# Patient Record
Sex: FEMALE | Race: WHITE | NOT HISPANIC OR LATINO | Employment: UNEMPLOYED
[De-identification: names, ages, dates, MRNs, and addresses within clinical notes are randomized per-mention and may not be internally consistent; named-entity substitution may affect disease eponyms.]

---

## 2017-05-27 ENCOUNTER — HEALTH MAINTENANCE LETTER (OUTPATIENT)
Age: 42
End: 2017-05-27

## 2017-09-02 ENCOUNTER — HEALTH MAINTENANCE LETTER (OUTPATIENT)
Age: 42
End: 2017-09-02

## 2017-09-29 ENCOUNTER — HOSPITAL ENCOUNTER (OUTPATIENT)
Dept: BEHAVIORAL HEALTH | Facility: CLINIC | Age: 42
End: 2017-09-29
Attending: PSYCHIATRY & NEUROLOGY
Payer: COMMERCIAL

## 2017-09-29 VITALS
DIASTOLIC BLOOD PRESSURE: 63 MMHG | SYSTOLIC BLOOD PRESSURE: 78 MMHG | TEMPERATURE: 97.6 F | HEIGHT: 64 IN | WEIGHT: 251.8 LBS | BODY MASS INDEX: 42.99 KG/M2 | HEART RATE: 81 BPM

## 2017-09-29 PROCEDURE — 10020000 ZZH LODGING PLUS FACILITY CHARGE ADULT

## 2017-09-29 PROCEDURE — 40000007 ZZH STATISTIC ADULT CD FACE TO FACE-NO CHRG

## 2017-09-29 RX ORDER — AMOXICILLIN 250 MG
2 CAPSULE ORAL DAILY PRN
COMMUNITY
End: 2017-10-21

## 2017-09-29 ASSESSMENT — PATIENT HEALTH QUESTIONNAIRE - PHQ9: SUM OF ALL RESPONSES TO PHQ QUESTIONS 1-9: 17

## 2017-09-29 ASSESSMENT — ANXIETY QUESTIONNAIRES
5. BEING SO RESTLESS THAT IT IS HARD TO SIT STILL: SEVERAL DAYS
7. FEELING AFRAID AS IF SOMETHING AWFUL MIGHT HAPPEN: MORE THAN HALF THE DAYS
1. FEELING NERVOUS, ANXIOUS, OR ON EDGE: NEARLY EVERY DAY
2. NOT BEING ABLE TO STOP OR CONTROL WORRYING: NEARLY EVERY DAY
3. WORRYING TOO MUCH ABOUT DIFFERENT THINGS: NEARLY EVERY DAY
GAD7 TOTAL SCORE: 17
4. TROUBLE RELAXING: NEARLY EVERY DAY
6. BECOMING EASILY ANNOYED OR IRRITABLE: MORE THAN HALF THE DAYS
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
## 2017-09-29 NOTE — PROGRESS NOTES
9/29/17  Pt entered the [facility] unit, this date.  Counselor met with Pt and provided basic materials to include a treatment goal sheet and a patient safety plan worksheet.  Pt appears open and willing for treatment at this time.  Pt to attend the [facility] orientation and continue program.

## 2017-09-29 NOTE — PROGRESS NOTES
Name: Jacey Sandoval  Date: 9/29/2017  Medical Record: [id number]    Envelope Number: [id number]    List of Contents (List each item separately in new row):   1 black (Tracie brand) mixing dye container  1 maroon liquid measurement container  2 sachets/packets of [employer] Color[employer]ase  Admission:  I am responsible for any personal items that are not sent to the safe or pharmacy.  [facility] is not responsible for loss, theft or damage of any property in my possession.      Patient Signature:  ___________________________________________       Date/Time:__________________________    Staff Signature: __________________________________       Date/Time:__________________________    2nd Staff person, if patient is unable/unwilling to sign:      __________________________________________________________       Date/Time: __________________________      Discharge:  [facility] has returned all of my personal belongings:    Patient Signature: ________________________________________     Date/Time: ____________________________________    Staff Signature: ______________________________________     Date/Time:_____________________________________

## 2017-09-29 NOTE — PROGRESS NOTES
Initial Services Plan        Before your first treatment group, please do the following    Immediate health & safety concerns: Look for sober housing and a supportive social network.  Obtain personal items (glasses, hearing aides, medicines, diabetes supplies, clothing, etc.).  Look for a support network (such as AA, NA, DBT group, a Catholic group, etc.)    Suggestions for client during the time between intake & completion of treatment plan:  Tour your treatment center (unit or outpatient clinic).  Introduce yourself to the treatment group.  Spend time getting to know your peers.  Complete your psycho-social paperwork.  Complete the problem list for your treatment plan.  Start drug and alcohol use history.  Review your patient or client handbook.  Identify concerns about whom to ask for family week    Client issues to be addressed in the first treatment sessions:  Identify outside concerns that may interfere with treatment, i.e. bills not getting paid, homesick for children      NABIL Preston  9/29/2017  11:11 AM
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
## 2017-09-29 NOTE — PROGRESS NOTES
[place] [address]            Dear Amalia Ortega,    This is to verify that Jacey Sandoval (: 1975) was admitted for treatment of chemical dependency at [place], [place] on 2017.    This individual is currently participating in:   ______ Inpatient   ___x__ [facility] (Residential Non-Medical)   ______ Day Outpatient   ______ Evening Outpatient       The client will participate in the program 28 days.  Treatment is A.A. based and helps clients to achieve a chemically free lifestyle through lectures, individual, family and group therapy.  She has been assigned to Raghav Harley and Katya Heck  /  Telephone: [phone] as her primary counselors.  If you have further questions, please contact us.    Respectfully,      Jacey Ram MA [facility]    (faxed 10/2/2017)

## 2017-09-29 NOTE — PROGRESS NOTES
Name: Jacey Sandoval  Date: 9/29/2017  Medical Record: [id number]    Envelope Number: [id number]    List of Contents (List each item separately in new row):   1 60ml (tube) I.color (permanent conditioning creme color)  1pc. black hairdye brush  2 tubes (LOGICS) color DNA system (small boxes 2pcs)  Admission:  I am responsible for any personal items that are not sent to the safe or pharmacy.  [facility] is not responsible for loss, theft or damage of any property in my possession.      Patient Signature:  ___________________________________________       Date/Time:__________________________    Staff Signature: __________________________________       Date/Time:__________________________    2nd Staff person, if patient is unable/unwilling to sign:      __________________________________________________________       Date/Time: __________________________      Discharge:  [facility] has returned all of my personal belongings:    Patient Signature: ________________________________________     Date/Time: ____________________________________    Staff Signature: ______________________________________     Date/Time:_____________________________________

## 2017-09-30 ENCOUNTER — HOSPITAL ENCOUNTER (OUTPATIENT)
Dept: BEHAVIORAL HEALTH | Facility: CLINIC | Age: 42
End: 2017-09-30
Attending: FAMILY MEDICINE
Payer: COMMERCIAL

## 2017-09-30 PROCEDURE — H2035 A/D TX PROGRAM, PER HOUR: HCPCS | Mod: HQ

## 2017-09-30 PROCEDURE — 10020000 ZZH LODGING PLUS FACILITY CHARGE ADULT

## 2017-09-30 ASSESSMENT — ANXIETY QUESTIONNAIRES: GAD7 TOTAL SCORE: 17

## 2017-10-01 ENCOUNTER — HOSPITAL ENCOUNTER (OUTPATIENT)
Dept: BEHAVIORAL HEALTH | Facility: CLINIC | Age: 42
End: 2017-10-01
Attending: FAMILY MEDICINE
Payer: COMMERCIAL

## 2017-10-01 PROCEDURE — H2035 A/D TX PROGRAM, PER HOUR: HCPCS | Mod: HQ

## 2017-10-01 PROCEDURE — 10020000 ZZH LODGING PLUS FACILITY CHARGE ADULT

## 2017-10-02 ENCOUNTER — HOSPITAL ENCOUNTER (OUTPATIENT)
Dept: BEHAVIORAL HEALTH | Facility: CLINIC | Age: 42
End: 2017-10-02
Attending: FAMILY MEDICINE
Payer: COMMERCIAL

## 2017-10-02 PROCEDURE — H2035 A/D TX PROGRAM, PER HOUR: HCPCS | Mod: HQ

## 2017-10-02 PROCEDURE — 10020000 ZZH LODGING PLUS FACILITY CHARGE ADULT
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
## 2017-10-02 NOTE — PROGRESS NOTES
"Patient Safety Plan Template    Name:   Jacey Sandoval YOB: 1975 Age:  42 year old MR Number:  [id number]   Step 1: Warning signs (Thoughts, images, mood, situation, behavior) that a crisis may be developin.  Something happens with one of my girls     2. I stop giving a damn     3.  I want \"it\" to be over     Step 2: Internal coping strategies - Things I can do to take my mind off of my problems without contacting another person (relaxation technique, physical activity):     1. Work with my critters     2.  Tanya     3. Watch a funny movie     Step 3: People and social settings that provide distraction:     1. Name: Jamaal   Phone: [phone]   2. Name: Sarah    Phone:  Non comp   3. Place: upstairs    4. Place: outside     The one thing that is most important to me and worth living for is: my girls     Step 4: People whom I can ask for help:     1. Name: Disha    Phone: non comp     2. Name: Mel   Phone: [phone]     3. Name: Jamaal Cagle Phone: [phone]     Step 5: Professionals or agencies I can contact during a crisis:     1. Clinician Name:Amalia Ortega                                  [facility] Phone:  [phone]   Clinician Pager or Emergency Contact #: non comp     2. Clinician Name: Dionicio Swift.    Phone: [phone]     Clinician Pager or Emergency Contact #: non comp     3. Local Urgent Care Services: non comp    Urgent Care Services Address: non comp    Urgent Care Services Phone: non comp     4. Suicide Prevention [facility] Phone: [phone] ([phone]     Step 6: Making the environment safe:     1. Don't buy alcohol/call for a ride     2.  Don't have any money     Safety Plan Template 2008 Jacque Cheng and Dimitri Bae is reprinted with the express permission of the authors.  No portion of the Safety Plan Template may be reproduced without the express, written permission.  You can contact the authors at [email or web address] or " [email or web address].

## 2017-10-03 ENCOUNTER — HOSPITAL ENCOUNTER (OUTPATIENT)
Dept: BEHAVIORAL HEALTH | Facility: CLINIC | Age: 42
End: 2017-10-03
Attending: FAMILY MEDICINE
Payer: COMMERCIAL

## 2017-10-03 PROCEDURE — 10020000 ZZH LODGING PLUS FACILITY CHARGE ADULT

## 2017-10-03 PROCEDURE — H2035 A/D TX PROGRAM, PER HOUR: HCPCS | Mod: HQ

## 2017-10-03 NOTE — PROGRESS NOTES
10/3/17  Counselor left a message with [facility] , Amalia Ortega, regarding requested information about Pt's admit and discharge date.

## 2017-10-04 ENCOUNTER — HOSPITAL ENCOUNTER (OUTPATIENT)
Dept: BEHAVIORAL HEALTH | Facility: CLINIC | Age: 42
End: 2017-10-04
Attending: FAMILY MEDICINE
Payer: COMMERCIAL

## 2017-10-04 PROCEDURE — 10020000 ZZH LODGING PLUS FACILITY CHARGE ADULT

## 2017-10-04 PROCEDURE — H2035 A/D TX PROGRAM, PER HOUR: HCPCS | Mod: HQ

## 2017-10-04 NOTE — PROGRESS NOTES
Acknowledgement of Current Treatment Plan     1. I have reviewed my treatment plan with my therapist / counselor on 10/4/17. I agree with the plan as it is written in the electronic health record.    Name Signature    Jacey Sandoval    Name of Therapist / Counselor     Raghav FERRER      2. I have completed and reviewed my Safety Plan with my counselor and signed this on 10/2/17. I have been given the hard copy of this plan.    Patient signature:  ________________________________________________________________________    Signatures required for any additional Problems, Goals, and/or Interventions added to treatment plan:    I have been given a copy of the addition to my treatment plan in Dimension _____ on [date           ] and I agree with this as it is written in the electronic record.     Patient signature:   ________________________________________________________________________    I have been given a copy of the addition to my treatment plan in Dimension _____ on [date           ] and I agree with this as it is written in the electronic record.      Patient signature:   ________________________________________________________________________    I have been given a copy of the addition to my treatment plan in Dimension _____ on [date          ] and I agree with this as it is written in the electronic record.

## 2017-10-04 NOTE — PROGRESS NOTES
"10/4/17  D - Patient completed \"Book of Farideh\" assignment and presented in group therapy today.  Patient processed processed her negative self-talk and identified personal strengths for positive self-talk. Patient discussed her strength of intelligence as a hinder to recovery at time, because \"you can't outsmart addiction.\"    I - Counselor facilitated group therapy and patient's peers provided supportive feedback.   A - Patient presents with flat affect and emotionally constricted. Patient is open to giving and receiving feedback during group therapy.   P - Patient to continue to work on goals in treatment plan.  NABIL Kat  "

## 2017-10-05 ENCOUNTER — HOSPITAL ENCOUNTER (OUTPATIENT)
Dept: BEHAVIORAL HEALTH | Facility: CLINIC | Age: 42
End: 2017-10-05
Attending: FAMILY MEDICINE
Payer: COMMERCIAL

## 2017-10-05 PROCEDURE — H2035 A/D TX PROGRAM, PER HOUR: HCPCS | Mod: HQ

## 2017-10-05 PROCEDURE — 10020000 ZZH LODGING PLUS FACILITY CHARGE ADULT

## 2017-10-06 ENCOUNTER — HOSPITAL ENCOUNTER (OUTPATIENT)
Dept: BEHAVIORAL HEALTH | Facility: CLINIC | Age: 42
End: 2017-10-06
Attending: FAMILY MEDICINE
Payer: COMMERCIAL

## 2017-10-06 PROCEDURE — 10020000 ZZH LODGING PLUS FACILITY CHARGE ADULT

## 2017-10-06 PROCEDURE — H2035 A/D TX PROGRAM, PER HOUR: HCPCS | Mod: HQ

## 2017-10-06 NOTE — PROGRESS NOTES
"10/06/2017    Patient shared assignment on \"Consequences\" in group this morning. Counselor facilitated group therapy with peer feedback. Patient shared that parenting, healthy relationships, enjoying life, and health as values that have been affected by her use. Patient expressed her feelings regarding her children being taken away and patient received peer feedback.   "

## 2017-10-07 ENCOUNTER — HOSPITAL ENCOUNTER (OUTPATIENT)
Dept: BEHAVIORAL HEALTH | Facility: CLINIC | Age: 42
End: 2017-10-07
Attending: FAMILY MEDICINE
Payer: COMMERCIAL

## 2017-10-07 PROCEDURE — 10020000 ZZH LODGING PLUS FACILITY CHARGE ADULT

## 2017-10-07 PROCEDURE — H2035 A/D TX PROGRAM, PER HOUR: HCPCS | Mod: HQ

## 2017-10-08 ENCOUNTER — HOSPITAL ENCOUNTER (OUTPATIENT)
Dept: BEHAVIORAL HEALTH | Facility: CLINIC | Age: 42
End: 2017-10-08
Attending: FAMILY MEDICINE
Payer: COMMERCIAL

## 2017-10-08 PROCEDURE — 10020000 ZZH LODGING PLUS FACILITY CHARGE ADULT

## 2017-10-08 PROCEDURE — H2035 A/D TX PROGRAM, PER HOUR: HCPCS | Mod: HQ

## 2017-10-09 ENCOUNTER — HOSPITAL ENCOUNTER (OUTPATIENT)
Dept: BEHAVIORAL HEALTH | Facility: CLINIC | Age: 42
End: 2017-10-09
Attending: FAMILY MEDICINE
Payer: COMMERCIAL

## 2017-10-09 PROCEDURE — H2035 A/D TX PROGRAM, PER HOUR: HCPCS

## 2017-10-09 PROCEDURE — H2035 A/D TX PROGRAM, PER HOUR: HCPCS | Mod: HQ

## 2017-10-09 PROCEDURE — 10020000 ZZH LODGING PLUS FACILITY CHARGE ADULT

## 2017-10-09 NOTE — PROGRESS NOTES
"INDIVIDUAL SESSION SUMMARY    D) Met with client on 10/9/17 from 12:30-1:25. Client reported that she has a psychiatry and therapy appointment scheduled for November at [facility] in [place]. Client reported she has been with her spouse for 4 years and that they have a 2 yr old daughter whom is living with and will soon be adopted by her spouse's cousin. Client reported she has an older daughter whom lives with her bio father. Client stated that she is unemployed and cannot drive. Client spoke of stressors including: CPS involvement, parole and lack of relationship with her children. Client spoke of having mixed feelings about the relationship with her spouse because of his own issues with alcohol and how his past relationships with women impact him today; that he has a history of reacting defensively when she speak to  about things. Client spoke of how she has hope for the relationship especially if her spouse would also commit to counseling so she is not the only one making an effort to get healthy. Client reported feeling \"resentments\" towards her spouse that she wants to work through. Client spoke about the lack of relationship with her oldest daughter and her hope that her daughter will want to have a relationship with her at some point in the future. Client spoke to how she \"relys\" on her spouse too much and her need to develop her own support system outside of her relationship. Client reported that her prior DBT therapy has helped her with emotional regulation and expressing her feelings in a healthier way and that she is committed to continue with therapy in [place].   I) Individual session with client. Provided client with verbal interventions including: validation, support.   A) Client appears emotionally constricted and to lack skills for emotional regulation and stress management. Client appears to lack a sober support network.   P) Next session is scheduled for 10/17/17.   Nhung Velez, " LMFT  10/9/2017

## 2017-10-10 ENCOUNTER — HOSPITAL ENCOUNTER (OUTPATIENT)
Dept: BEHAVIORAL HEALTH | Facility: CLINIC | Age: 42
End: 2017-10-10
Attending: FAMILY MEDICINE
Payer: COMMERCIAL

## 2017-10-10 PROCEDURE — H2035 A/D TX PROGRAM, PER HOUR: HCPCS | Mod: HQ

## 2017-10-10 PROCEDURE — 10020000 ZZH LODGING PLUS FACILITY CHARGE ADULT
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
## 2017-10-11 ENCOUNTER — HOSPITAL ENCOUNTER (OUTPATIENT)
Dept: BEHAVIORAL HEALTH | Facility: CLINIC | Age: 42
End: 2017-10-11
Attending: FAMILY MEDICINE
Payer: COMMERCIAL

## 2017-10-11 PROCEDURE — H2035 A/D TX PROGRAM, PER HOUR: HCPCS | Mod: HQ

## 2017-10-11 PROCEDURE — 10020000 ZZH LODGING PLUS FACILITY CHARGE ADULT

## 2017-10-12 ENCOUNTER — HOSPITAL ENCOUNTER (OUTPATIENT)
Dept: BEHAVIORAL HEALTH | Facility: CLINIC | Age: 42
End: 2017-10-12
Attending: FAMILY MEDICINE
Payer: COMMERCIAL

## 2017-10-12 PROCEDURE — 10020000 ZZH LODGING PLUS FACILITY CHARGE ADULT

## 2017-10-12 PROCEDURE — H2035 A/D TX PROGRAM, PER HOUR: HCPCS | Mod: HQ

## 2017-10-12 NOTE — PROGRESS NOTES
"10/12/17    Patient presented assignment on \"Self Sabotage and out Self Defeating Behaviors\" in group this morning. Counselor facilitated group therapy along with peer feedback. Patient listed 20 positive \"I Am\" affirmations and received positive peer feedback.   "

## 2017-10-13 ENCOUNTER — HOSPITAL ENCOUNTER (OUTPATIENT)
Dept: BEHAVIORAL HEALTH | Facility: CLINIC | Age: 42
End: 2017-10-13
Attending: FAMILY MEDICINE
Payer: COMMERCIAL

## 2017-10-13 PROCEDURE — H2035 A/D TX PROGRAM, PER HOUR: HCPCS | Mod: HQ

## 2017-10-13 PROCEDURE — 10020000 ZZH LODGING PLUS FACILITY CHARGE ADULT

## 2017-10-14 ENCOUNTER — HOSPITAL ENCOUNTER (OUTPATIENT)
Dept: BEHAVIORAL HEALTH | Facility: CLINIC | Age: 42
End: 2017-10-14
Attending: FAMILY MEDICINE
Payer: COMMERCIAL

## 2017-10-14 PROCEDURE — H2035 A/D TX PROGRAM, PER HOUR: HCPCS | Mod: HQ

## 2017-10-14 PROCEDURE — 10020000 ZZH LODGING PLUS FACILITY CHARGE ADULT

## 2017-10-15 ENCOUNTER — HOSPITAL ENCOUNTER (OUTPATIENT)
Dept: BEHAVIORAL HEALTH | Facility: CLINIC | Age: 42
End: 2017-10-15
Attending: FAMILY MEDICINE
Payer: COMMERCIAL

## 2017-10-15 PROCEDURE — 10020000 ZZH LODGING PLUS FACILITY CHARGE ADULT

## 2017-10-15 PROCEDURE — H2035 A/D TX PROGRAM, PER HOUR: HCPCS | Mod: HQ

## 2017-10-16 ENCOUNTER — HOSPITAL ENCOUNTER (OUTPATIENT)
Dept: BEHAVIORAL HEALTH | Facility: CLINIC | Age: 42
End: 2017-10-16
Attending: FAMILY MEDICINE
Payer: COMMERCIAL

## 2017-10-16 PROCEDURE — H2035 A/D TX PROGRAM, PER HOUR: HCPCS | Mod: HQ

## 2017-10-16 PROCEDURE — 10020000 ZZH LODGING PLUS FACILITY CHARGE ADULT

## 2017-10-16 NOTE — PROGRESS NOTES
DIM 5 RISK 4  Pt shared assignment on cross-addiction in group this am. She identified having issues with anger, drugs alcohol spending and stealing. She received supportive feedback from peers and staff. Pt appears to be distant from her emotions, there is no inflection in her tone, facial gestures or other emotions.  Pt to follow individual tx plan goals.

## 2017-10-17 ENCOUNTER — HOSPITAL ENCOUNTER (OUTPATIENT)
Dept: BEHAVIORAL HEALTH | Facility: CLINIC | Age: 42
End: 2017-10-17
Attending: FAMILY MEDICINE
Payer: COMMERCIAL

## 2017-10-17 PROCEDURE — H2035 A/D TX PROGRAM, PER HOUR: HCPCS | Mod: HQ

## 2017-10-17 PROCEDURE — 10020000 ZZH LODGING PLUS FACILITY CHARGE ADULT

## 2017-10-17 NOTE — PROGRESS NOTES
Patient:  Jacey Sandoval              Adult CD Progress Note and Treatment Plan Review     Attendance  Please refer to OP BEH CD Adult Attendance Record Documentation Flowsheet    Support group attended this week: yes    Reporting sobriety:  yes    Treatment Plan     Treatment Plan Review completed on :  10/17/17    Treatment Plan Review competed on:  Katya FERRER, Raghav ROSAS, [facility]       Client preferred learning style: Visual  Hands on  Demonstration    Staff Members contributing   Katya FERRER, Ana ROSAS, Raghav FERRER      Received Supervision: yes    Client: contributed to goals and plan.    Client received copy of plan/revised plan: Yes    Client agrees with plan/revised plan: Yes    Changes to Treatment Plan: No    New Goals added since last review none    Goals worked on since last review  Continuing stabilization,  relapse prevention,  spirituality, grief/loss, aftercare planning, self-defeating/self-sabotaging, cross-addiction    Strategies effective: yes    Strategies need these changes: NA    ASAM Risk Rating:    Dimension 1 0  Pt denies any symptoms of withdrawal at this time.  She appears full functioning.    Dimension 2 0  Pt reports she has been experiencing some headaches.  She is able to attend all programming and access medical care if needed.      Dimension 3  2  Pt reports feeling more anxious and irritable.  She reports feeling some stress in relation to being in [facility] without her doctors and medications.  Pt presents a flat affect and lacks emotional connection when sharing.  It appears Pt gains positive feelings  from peers recognizing her intellect.  Pt is continuing to meet with therapist, Nhung Velez.  Pt attended the grief/loss focus group facilitated by NILESH García MAW.  Pt denies any thoughts of self harm or suicide at this time.   Pt has a therapy appointment set up for ongoing care in her home community    Dimension  4 2  Pt is attending programming and participating in exercises.  Pt appears to be interacting with her peers.   She shared an assignment on cross-addiction sharing how compulsive patterns have included spending, eating, sex and stealing. Pt recognizes her need for balance.  Pt reports she is motivated to resolve her legal issues.     Dimension 5   4 Pt attended relapse prevention workshops and began to identify triggers/warning signs and to develop coping skills.  Pt reports she is not experiencing cravings at this time..   Pt reports feeling there is nothing hindering her from being successful in [facility] this week. Pt shared an assignment on self-defeating/self-sabotaging patterns.   Pt attended the spiritual care focus group facilitated by Tracie Cervantes and supervised by Raghav FERRER.   She verbalizes her willingness to enter an outpatient program following [facility] completion.    Dimension 6  3  Pt is attending 12 step meetings on the unit and spends time with female peers.  Pt plans to return to her home following [facility].      Any changes in Vulnerable Adult Status?  No  If yes, add to treatment plan and individual abuse prevention plan.    Family Involvement:   none schedule this week    Data:   Pt is attending daily lectures and reports the lectures on anger and emotional management were very helpful to her.  Pt reports she is working toward developing skills to manage her emotions effectively.    Intervention:   Aftercare planning  Counselor feedback  Education  Emotional management  Group feedback  Relapse prevention    Assessment:   Stages of Change Model  Contemplation  Preparation/Determination    Appears/Sounds:  Cooperative  Motivated  Engaged    Plan:  Focus on recovery environment  Monitor emotional/physical health    NABIL Hull

## 2017-10-18 ENCOUNTER — HOSPITAL ENCOUNTER (OUTPATIENT)
Dept: BEHAVIORAL HEALTH | Facility: CLINIC | Age: 42
End: 2017-10-18
Attending: FAMILY MEDICINE
Payer: COMMERCIAL

## 2017-10-18 PROCEDURE — H2035 A/D TX PROGRAM, PER HOUR: HCPCS

## 2017-10-18 PROCEDURE — H2035 A/D TX PROGRAM, PER HOUR: HCPCS | Mod: HQ

## 2017-10-18 PROCEDURE — 10020000 ZZH LODGING PLUS FACILITY CHARGE ADULT

## 2017-10-18 NOTE — PROGRESS NOTES
"INDIVIDUAL SESSION SUMMARY    D) Met with client on 10/18/17 from 1:30-2:30. Client reported feeling \"bad\" about an interaction with spouse the other day on the phone where she was \"short with him\". Client stated \"I don't like behaving in a way that's unkind but unfortunately it works\" with her spouse. Therapist and client spoke of the parent-child dynamic that has evolved in their relationship and how the client doesn't want to continue this pattern. Client reported with excitement that her spouse had lined up therapy services for himself. Client spoke about her struggle with this weeks homework exercise which was to write a letter to her mother. Client spoke to how she has accepted that she won't have the kind of emotional support she would like from her mother and that she felt writing the letter may be unhelpful for her recovery. Therapist encouraged client to speak with her counselors. Client and therapist spoke about accepting her spouse's limitations and other options she has to get her needs met other than playing the mother role in their relationship.  Client spoke of some frustrations she is having with peers in treatment and how she is coping with some peers by avoiding them. Client spoke of feeling like her boundaries were crossed in a group last week and therapist encouraged client to share the details with her primary counselors.   I) Individual session with client. Provided client with verbal interventions including: validation, support, redirection to what she can control and accepting spouse's limitations.   A) Client appears emotionally constricted and to lack skills for emotional regulation and stress management. Client appears to lack a sober support network.   P) Next session is scheduled for 10/25/17.  GABRIELA Orr  10/18/2017    "

## 2017-10-19 ENCOUNTER — HOSPITAL ENCOUNTER (OUTPATIENT)
Dept: BEHAVIORAL HEALTH | Facility: CLINIC | Age: 42
End: 2017-10-19
Attending: FAMILY MEDICINE
Payer: COMMERCIAL

## 2017-10-19 PROCEDURE — 10020000 ZZH LODGING PLUS FACILITY CHARGE ADULT

## 2017-10-19 PROCEDURE — H2035 A/D TX PROGRAM, PER HOUR: HCPCS | Mod: HQ
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
## 2017-10-20 ENCOUNTER — HOSPITAL ENCOUNTER (OUTPATIENT)
Dept: BEHAVIORAL HEALTH | Facility: CLINIC | Age: 42
End: 2017-10-20
Attending: FAMILY MEDICINE
Payer: COMMERCIAL

## 2017-10-20 PROCEDURE — H2035 A/D TX PROGRAM, PER HOUR: HCPCS | Mod: HQ

## 2017-10-20 PROCEDURE — 10020000 ZZH LODGING PLUS FACILITY CHARGE ADULT

## 2017-10-21 ENCOUNTER — HOSPITAL ENCOUNTER (OUTPATIENT)
Dept: BEHAVIORAL HEALTH | Facility: CLINIC | Age: 42
End: 2017-10-21
Attending: FAMILY MEDICINE
Payer: COMMERCIAL

## 2017-10-21 PROCEDURE — H2035 A/D TX PROGRAM, PER HOUR: HCPCS | Mod: HQ

## 2017-10-21 PROCEDURE — 10020000 ZZH LODGING PLUS FACILITY CHARGE ADULT

## 2017-10-22 ENCOUNTER — HOSPITAL ENCOUNTER (OUTPATIENT)
Dept: BEHAVIORAL HEALTH | Facility: CLINIC | Age: 42
End: 2017-10-22
Attending: FAMILY MEDICINE
Payer: COMMERCIAL

## 2017-10-22 PROCEDURE — 10020000 ZZH LODGING PLUS FACILITY CHARGE ADULT

## 2017-10-22 PROCEDURE — H2035 A/D TX PROGRAM, PER HOUR: HCPCS | Mod: HQ

## 2017-10-22 NOTE — PROGRESS NOTES
Nursing Discharge Planning Meeting    Writer completed discharge planning meeting with patient. Discharge is planned for Thursday or Friday (10/26 or 10/27) pending counselor approval as patient ride is available on Thursday. Patient will be discharging to home.     Discussed appropriate follow up care to manage migraines, blood pressure, sleep and to obtain medication refills. Patient given a copy of her current medications for reference. Questions answered and patient verbalized understanding of post-discharge follow up plan. Patient mentioned she is NOT allergic to Percocet so this was removed from her allergy list at this time. [facility] clinic appointment request made at this time for patient to obtain medication refills prior to discharge as her next appointment is not until November 7th and the patient does not have enough medication to last until then.    Patient has an appointment on 11/7/2017 at [facility] with a new provider that will take over prescribing patient's trazodone and Quetiapine.     Continue to support patient in discharge planning as needed to assure appropriate continuity of care.     Essential Oil Therapy  Patient used essential oil therapy during treatment program: No

## 2017-10-23 ENCOUNTER — HOSPITAL ENCOUNTER (OUTPATIENT)
Dept: BEHAVIORAL HEALTH | Facility: CLINIC | Age: 42
End: 2017-10-23
Attending: FAMILY MEDICINE
Payer: COMMERCIAL

## 2017-10-23 ENCOUNTER — OFFICE VISIT (OUTPATIENT)
Dept: BEHAVIORAL HEALTH | Facility: CLINIC | Age: 42
End: 2017-10-23
Payer: COMMERCIAL

## 2017-10-23 VITALS
HEART RATE: 63 BPM | TEMPERATURE: 98.2 F | OXYGEN SATURATION: 96 % | BODY MASS INDEX: 46.35 KG/M2 | DIASTOLIC BLOOD PRESSURE: 68 MMHG | SYSTOLIC BLOOD PRESSURE: 98 MMHG | RESPIRATION RATE: 16 BRPM | WEIGHT: 270 LBS

## 2017-10-23 DIAGNOSIS — F10.10 ALCOHOL ABUSE: ICD-10-CM

## 2017-10-23 DIAGNOSIS — F19.20 CHEMICAL DEPENDENCY (H): ICD-10-CM

## 2017-10-23 DIAGNOSIS — G47.00 PERSISTENT INSOMNIA: Primary | ICD-10-CM

## 2017-10-23 PROCEDURE — 10020000 ZZH LODGING PLUS FACILITY CHARGE ADULT

## 2017-10-23 PROCEDURE — H2035 A/D TX PROGRAM, PER HOUR: HCPCS | Mod: HQ

## 2017-10-23 PROCEDURE — 99214 OFFICE O/P EST MOD 30 MIN: CPT | Performed by: NURSE PRACTITIONER

## 2017-10-23 RX ORDER — QUETIAPINE FUMARATE 100 MG/1
100 TABLET, FILM COATED ORAL AT BEDTIME
Qty: 30 TABLET | Refills: 0 | Status: SHIPPED | OUTPATIENT
Start: 2017-10-23 | End: 2023-11-02

## 2017-10-23 RX ORDER — TRAZODONE HYDROCHLORIDE 100 MG/1
100 TABLET ORAL AT BEDTIME
Qty: 30 TABLET | Refills: 0 | Status: SHIPPED | OUTPATIENT
Start: 2017-10-23 | End: 2023-11-02

## 2017-10-23 NOTE — PROGRESS NOTES
SUBJECTIVE:                                                    Jacey Sandoval is a 42 year old  female who presents to clinic today for the following health issues:    [facility] the  [facility] for [facility].  Patient states that this is the third time at treatment for alcohol abuse. Patient states that it was a recommendation from [facility].       Medication Followup of  Seroquel and Trazodone   Patient states that she would like these medications refilled because she is almost out of them and will be discharged from treatment Thursday but does not have an appointment scheduled until November 7th with a new provider moving forward.     Taking Medication as prescribed: yes    Side Effects:  Rest less leg syndrome when pt increases dosage     Medication Helping Symptoms:  yes       Mental Health Follow up   Patient has been at the [facility] for 24 days will be leaving a day earlier due to transportation concerns. Patient has 2 kids 16 and 2 year old.     Status since last visit:     See PHQ-9 for current symptoms.  Other associated symptoms: None    Complicating factors: none.   Significant life event:  No   Current substance abuse:  None  Anxiety or Panic symptoms:  No    Sleep - patient states that she is unable to fall asleep or stay asleep without these  medications, does not use any device while sleeping. Reports a sleep study done in 2002, and has been on sleeping medications since then.   Appetite - good.   Exercise - does not exercise, walking to the mail box. Otherwise states that she has chronic back issues.     Smoking - no   Alcohol - no  Street drugs -  Marijuana - no    PHQ-9  English PHQ-9   Any Language             Problems taking medications regularly: No    Medication side effects: none    Diet: regular (no restrictions)  Social History   Substance Use Topics     Smoking status: Never Smoker     Smokeless tobacco: Never Used     Alcohol use No      Comment:  in AA currently 3/11/2013  - currently in tx        Problem list and histories reviewed & adjusted, as indicated.  Additional history: as documented    Patient Active Problem List   Diagnosis     Migraine     Allergic rhinitis due to other allergen     Obesity     Anxiety     PTSD (post-traumatic stress disorder)     Alcohol abuse     Anemia     Status post bariatric surgery     B12 deficiency anemia     CARDIOVASCULAR SCREENING; LDL GOAL LESS THAN 160     Health Care Home     Vitamin D deficiency     Insomnia     Chemical dependency (H)     Past Surgical History:   Procedure Laterality Date     ABDOMEN SURGERY      c- section     C NONSPECIFIC PROCEDURE  4    Childbirth     COLONOSCOPY  6/18/2010     GASTRIC BYPASS         Social History   Substance Use Topics     Smoking status: Never Smoker     Smokeless tobacco: Never Used     Alcohol use No      Comment:  in AA currently 3/11/2013 - currently in tx     Family History   Problem Relation Age of Onset     Connective Tissue Disorder Mother      ms     Unknown/Adopted Mother      Depression Mother      Bipolar Disorder Mother      Substance Abuse Mother      Dementia Mother      Unknown/Adopted Father      Depression Father            Current Outpatient Prescriptions   Medication Sig Dispense Refill     PROPRANOLOL HCL PO Take 20 mg by mouth Take one tablet by mouth every 4 hours as needed up to three times daily for anxiety       CELECOXIB PO Take 200 mg by mouth daily       HYDROXYZINE HCL PO Take 10 mg by mouth 2 times daily as needed for itching For anxiety       Cholecalciferol (VITAMIN D) 2000 UNITS CAPS 1 capsule daily 30 capsule 1     ORDER FOR DME Equipment being ordered: compression stocking, knee high, low compression 1 Device 0     QUEtiapine Fumarate (SEROQUEL PO) Take 100 mg by mouth At Bedtime       TRAZODONE HCL PO Take by mouth At Bedtime 100 mg tabs.  Take one to two tabs by mouth daily at bedtime for sleep       Allergies   Allergen Reactions     Zofran  [Methylparaben-Ondansetron-Propylpar] Other (See Comments)     Tape [Adhesive Tape] Rash     Recent Labs   Lab Test  05/11/15   1446  09/13/12   2234  06/03/12   1445  06/01/12   1638   ALT   --   35  14  <6   CR  0.71  0.69  0.72  0.94   GFRESTIMATED  >90  Non  GFR Calc    >90  >90  68   GFRESTBLACK  >90   GFR Calc    >90  >90  83   POTASSIUM  4.2  3.7  3.7  3.8      BP Readings from Last 3 Encounters:   05/11/15 108/70   11/06/13 113/70   03/11/13 112/70    Wt Readings from Last 3 Encounters:   05/11/15 281 lb (127.5 kg)   11/06/13 245 lb 12.8 oz (111.5 kg)   03/11/13 244 lb 9.6 oz (110.9 kg)        Labs reviewed in EPIC  Problem list, Medication list, Allergies, and Medical/Social/Surgical histories reviewed in [facility] and updated as appropriate.     ROS: Constitutional, neuro, ENT, endocrine, pulmonary, cardiac, gastrointestinal, genitourinary, musculoskeletal, integument and psychiatric systems are negative, except as otherwise noted above in the HPI.   OBJECTIVE:                                                    BP 98/68  Pulse 63  Temp 98.2  F (36.8  C) (Oral)  Resp 16  Wt 270 lb (122.5 kg)  SpO2 96%  BMI 46.35 kg/m2  Body mass index is 46.35 kg/(m^2).  GENERAL: obese, alert, well nourished, well hydrated, no distress  EYES: Eyes grossly normal to inspection, extraocular movements - intact, and PERRL  HENT: ear canals- normal; TMs- normal; Nose- normal; Mouth- no ulcers, no lesions  NECK: no tenderness, no adenopathy, no asymmetry, no masses, no stiffness; thyroid- normal to palpation  RESP: lungs clear to auscultation - no rales, no rhonchi, no wheezes  CV: regular rates and rhythm, normal S1 S2, no S3 or S4 and no murmur, no click or rub - no homans or cords  ABDOMEN: soft, no tenderness, normal bowel sounds  MS: extremities- no gross deformities noted, no edema  NEURO: strength and tone- normal, sensory exam- grossly normal, mentation- intact, speech- normal, reflexes-  symmetric  Non focal no aphasia. No facial asymmetry.  BACK: no CVA tenderness, no paralumbar tenderness  LYMPHATICS: ant. cervical- normal, post. cervical- normal, axillary- normal, supraclavicular- normal.    Mental Status Assessment:  Appearance:   Appropriate   Eye Contact:   Good   Psychomotor Behavior: Normal   Attitude:   Cooperative   Orientation:   All  Speech   Rate / Production: Normal    Volume:  Normal   Mood:    Normal  Affect:    Appropriate   Thought Content:  Clear   Thought Form:  Coherent  Logical   Insight:    Good     Diagnostic Test Results:  none      ASSESSMENT/PLAN:                                                    (G47.00) Persistent insomnia  (primary encounter diagnosis)  Comment:as noted above.   Plan: traZODone (DESYREL) 100 MG tablet, QUEtiapine         (SEROQUEL) 100 MG tablet        Refills done for one month.     (F19.20) Chemical dependency (H)  Comment: pt currently in treatment for alcohol use.   Plan: continue therapy and sobriety.       Patient Instructions   -Refills done.   -Call clinic with any questions or concerns.     I spent 25 min spent in direct face to face time with Jacey Sandoval, greater than 50% in counseling and coordination of care for: insomnia and transfer of care/prescription after treatment.     ELIAS Adorno [facility] PRIMARY CARE

## 2017-10-23 NOTE — PROGRESS NOTES
Patient:  Jacey Sandoval            Adult CD Progress Note and Treatment Plan Review     Attendance  Please refer to OP BEH CD Adult Attendance Record Documentation Flowsheet    Support group attended this week: yes    Reporting sobriety:  yes    Treatment Plan     Treatment Plan Review competed on:    10/24/17       Client preferred learning style: Visual  Hands on  Demonstration    Staff Members contributing   Katya Heck [facility], Ana Bonilla [facility],  Kelly Gallagher Intern and Raghav Harley [facility]                   Received Supervision: yes    Client: contributed to goals and plan.    Client received copy of plan/revised plan: Yes    Client agrees with plan/revised plan: Yes        Changes to Treatment Plan: No    New Goals added since last review   NA    Goals worked on since last review   Continuing stabilization, 1.1 therapy, abandonment, emotional management, anger/resentment, spirituality, relationships, grief/loss, boundaries    Strategies effective: yes    Strategies need these changes: NA    ASAM Risk Rating:    Dimension 1  0  Pt appears full functioning.  She denies any symptoms of withdrawal at this time.    Dimension 2  0  Pt denies any medical concerns at this time. She is able to attend all programming and access medical care as needed.    Dimension 3  2  Pt reports feeling some anxiousness in relation to contacting her  this week.  Her affect tends to present as flat.   Pt shared assignments on emotions, anger and resentment.  Pt shared that throughout her childhood she was taught that anger was not to be expressed.  She reports that when drinking her anger is expressed inappropriately.  She identified resentments she has been holding.  Pt reports feeling that therapy and DBT has been helpful in learning to express her anger without harming herself or others.  Pt reports she plans to continue working toward appropriate anger expression and letting go of resentments.  Pt  attended the grief/loss focus group facilitated by FARIHA García MA.  Pt denies any thoughts of suicide or self harm at this time.     Dimension 4  1-2  Pt is attending groups and lectures.  Pt reports she is motivated to resolve her legal issues.  Pt reports feeling she may be able to regain custody of her youngest daughter which presents motivation for her.      Dimension 5   4  Pt is identifying triggers and warning signs. Pt denies experiencing cravings at this time.  She attended the spiritual care focus group facilitated by Tracie Cervantes and attended by Kelly Gallagher, Rafi.  Pt plans to share an assignment on boundaries and codependency.  Pt is working toward development of her relapse prevention plan.    Dimension 6   3  Pt attended relationship workshops.  She reports her spouse has set up therapy for himself.  She reports she has been the controlling person in the relationship and would like him to assert himself and take more responsibility.  Pt reports her spouse has agreed to couples therapy sessions.  Pt reports she would like to become more engaged in her community with things like volunteering at the food shelf, engaging in a knitting [place] and attending 12 step meetings.  Pt attends 12 step meetings on the unit and spends time with her peers.      Any changes in Vulnerable Adult Status?  No  If yes, add to treatment plan and individual abuse prevention plan.    Family Involvement:   Concerned persons invited but did not attend    Data:   Pt is attending lectures and  participated in group discussions to include  forgiveness, hope in recovery and life in sobriety.    Intervention:   Counselor feedback  Education   Emotional management  Group feedback   Relationship workshops  Relapse prevention  Mental Health Education    Assessment:   Stages of Change Model  Contemplation  Preparation/Determination    Appears/Sounds:  Cooperative  Engaged  Anxious    Plan:  Focus on recovery  environment  Monitor emotional/physical health    NABIL Hull

## 2017-10-23 NOTE — MR AVS SNAPSHOT
After Visit Summary   10/23/2017    Jacey Sandoval    MRN: [id number]           Patient Information     Date Of Birth          1975        Visit Information        Provider Department      10/23/2017 1:00 PM Kasia Pacheco APRN CNP [facility]        Today's Diagnoses     Persistent insomnia    -  1    Chemical dependency (H)        Alcohol abuse          Care Instructions    -Refills done.   -Call clinic with any questions or concerns.             Follow-ups after your visit        Your next 10 appointments already scheduled     Oct 24, 2017  7:15 AM CDT   Treatment with ADULT LODGING PLUS E   [facility] ([facility])    [address] [place] [id number] [phone]            Oct 25, 2017  7:15 AM CDT   Treatment with ADULT LODGING PLUS E   [facility] ([facility])    [address] [place] [id number] [phone]            Oct 26, 2017  7:15 AM CDT   Treatment with ADULT LODGING PLUS E   [facility] ([facility])    [address] [place] [id number] [phone]              Who to contact     If you have questions or need follow up information about today's clinic visit or your schedule please contact [facility] directly at [phone].  Normal or non-critical lab and imaging results will be communicated to you by MyChart, letter or phone within 4 business days after the clinic has received the results. If you do not hear from us within 7 days, please contact the clinic through MyChart or phone. If you have a critical or abnormal lab result, we will notify you by phone as soon as possible.  Submit refill requests through [employer] or call your pharmacy and they will forward the refill  "request to us. Please allow 3 business days for your refill to be completed.          Additional Information About Your Visit        Service Management [employer] Information     [employer] lets you send messages to your doctor, view your test results, renew your prescriptions, schedule appointments and more. To sign up, go to [email or web address] . Click on \"Log in\" on the left side of the screen, which will take you to the Welcome page. Then click on \"Sign up Now\" on the right side of the page.     You will be asked to enter the access code listed below, as well as some personal information. Please follow the directions to create your username and password.     Your access code is: [email or web address]  Expires: 2018  1:31 PM     Your access code will  in 90 days. If you need help or a new code, please call your [facility] clinic or [phone].        Care EveryWhere ID     This is your Care EveryWhere ID. This could be used by other organizations to access your [facility] medical records  [id number]        Your Vitals Were     Pulse Temperature Respirations Pulse Oximetry BMI (Body Mass Index)       63 98.2  F (36.8  C) (Oral) 16 96% 46.35 kg/m2        Blood Pressure from Last 3 Encounters:   10/23/17 98/68   05/11/15 108/70   13 113/70    Weight from Last 3 Encounters:   10/23/17 270 lb (122.5 kg)   05/11/15 281 lb (127.5 kg)   13 245 lb 12.8 oz (111.5 kg)              Today, you had the following     No orders found for display         Today's Medication Changes          These changes are accurate as of: 10/23/17  1:31 PM.  If you have any questions, ask your nurse or doctor.               These medicines have changed or have updated prescriptions.        Dose/Directions    QUEtiapine 100 MG tablet   Commonly known as:  SEROQUEL   This may have changed:  medication strength   Used for:  Persistent insomnia   Changed by:  Kasia Pacheco APRN CNP        Dose:  100 mg   Take 1 tablet (100 mg) by mouth At Bedtime "   Quantity:  30 tablet   Refills:  0       traZODone 100 MG tablet   Commonly known as:  DESYREL   This may have changed:    - medication strength  - how much to take   Used for:  Persistent insomnia   Changed by:  Kasia Pacheco APRN CNP        Dose:  100 mg   Take 1 tablet (100 mg) by mouth At Bedtime 100 mg tabs.  Take one to two tabs by mouth daily at bedtime for sleep   Quantity:  30 tablet   Refills:  0            Where to get your medicines      These medications were sent to [facility] [place], [place] - [address]  [address] [address]     Phone:  [phone]     QUEtiapine 100 MG tablet    traZODone 100 MG tablet                Primary Care Provider Office Phone # Fax #    Alejandra Joy ELIAS Wong [phone] [address] [facility] [address]        Equal Access to Services     TIFFANY HARDEN : Hadii cruz ku hadasho Soomaali, waaxda luqadaha, qaybta kaalmada adeegyada, waxay raein aidan mcdonald . So [facility] [phone].    ATENCIÓN: Si habla español, tiene a will disposición servicios gratuitos de asistencia lingüística. Mamie al [phone].    We comply with applicable federal civil rights laws and [place] laws. We do not discriminate on the basis of race, color, national origin, age, disability, sex, sexual orientation, or gender identity.            Thank you!     Thank you for choosing [facility]  for your care. Our goal is always to provide you with excellent care. Hearing back from our patients is one way we can continue to improve our services. Please take a few minutes to complete the written survey that you may receive in the mail after your visit with us. Thank you!             Your Updated Medication List - Protect others around you: Learn how to safely use, store and throw away your medicines at www.disposemymeds.org.          This list is accurate as of: 10/23/17  1:31 PM.   Always use your most recent med list.                   Brand Name Dispense Instructions for use Diagnosis    CELECOXIB PO      Take 200 mg by mouth daily        HYDROXYZINE HCL PO      Take 10 mg by mouth 2 times daily as needed for itching For anxiety        order for DME     1 Device    Equipment being ordered: compression stocking, knee high, low compression    Pedal edema       PROPRANOLOL HCL PO      Take 20 mg by mouth Take one tablet by mouth every 4 hours as needed up to three times daily for anxiety        QUEtiapine 100 MG tablet    SEROQUEL    30 tablet    Take 1 tablet (100 mg) by mouth At Bedtime    Persistent insomnia       traZODone 100 MG tablet    DESYREL    30 tablet    Take 1 tablet (100 mg) by mouth At Bedtime 100 mg tabs.  Take one to two tabs by mouth daily at bedtime for sleep    Persistent insomnia       vitamin D 2000 UNITS Caps     30 capsule    1 capsule daily    Other and unspecified alcohol dependence, unspecified drinking behavior

## 2017-10-24 ENCOUNTER — HOSPITAL ENCOUNTER (OUTPATIENT)
Dept: BEHAVIORAL HEALTH | Facility: CLINIC | Age: 42
End: 2017-10-24
Attending: FAMILY MEDICINE
Payer: COMMERCIAL

## 2017-10-24 PROCEDURE — H2035 A/D TX PROGRAM, PER HOUR: HCPCS | Mod: HQ

## 2017-10-24 PROCEDURE — 10020000 ZZH LODGING PLUS FACILITY CHARGE ADULT

## 2017-10-24 NOTE — PROGRESS NOTES
10/24/17  Pt shared assignments on boundaries and relationship balance.  She shared ways she can communicate more effectively with her spouse.  Pt shared how drinking has negatively impacted their relationship.  Pt identified her needs in her marital relationship, actions she plans to take for her responsibility in the relationship and things she needs to do for herself.  Pt appears open to work toward improving communication with her spouse.

## 2017-10-25 ENCOUNTER — HOSPITAL ENCOUNTER (OUTPATIENT)
Dept: BEHAVIORAL HEALTH | Facility: CLINIC | Age: 42
End: 2017-10-25
Attending: FAMILY MEDICINE
Payer: COMMERCIAL

## 2017-10-25 PROCEDURE — 10020000 ZZH LODGING PLUS FACILITY CHARGE ADULT

## 2017-10-25 PROCEDURE — H2035 A/D TX PROGRAM, PER HOUR: HCPCS | Mod: HQ

## 2017-10-25 NOTE — PROGRESS NOTES
[address] [place] [address]          Jacey Sandoval, 1975, was admitted for evaluation/treatment of chemical dependency at [facility].  This person took part in these program(s):    ______ The Inpatient Program   ______ The Outpatient Program   __x___ The Lodging Plus Program   ______ Lodging Day Outpatient       Date admitted: 09/29/17  Date discharged: 10/26/17    Type of discharge:   __x___ Satisfactory - completed evaluation / treatment   ______ Discharged without completing   ______ Behavioral discharge   ______ Transferred to another chemical dependency program   ______ Transferred to another type of service   ______ Left against medical advice (AMA) / Eloped       Comments: referred to [facility] outpatient program       Counselor: NABIL Hull                       Date: 10/25/2017             Time: 2:35 PM

## 2017-10-26 ENCOUNTER — HOSPITAL ENCOUNTER (OUTPATIENT)
Dept: BEHAVIORAL HEALTH | Facility: CLINIC | Age: 42
End: 2017-10-26
Attending: FAMILY MEDICINE
Payer: COMMERCIAL

## 2017-10-26 PROCEDURE — H2035 A/D TX PROGRAM, PER HOUR: HCPCS | Mod: HQ
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
## 2017-12-16 ENCOUNTER — HEALTH MAINTENANCE LETTER (OUTPATIENT)
Age: 42
End: 2017-12-16

## 2018-03-17 ENCOUNTER — HEALTH MAINTENANCE LETTER (OUTPATIENT)
Age: 43
End: 2018-03-17

## 2018-06-23 ENCOUNTER — HEALTH MAINTENANCE LETTER (OUTPATIENT)
Age: 43
End: 2018-06-23

## 2018-09-29 ENCOUNTER — HEALTH MAINTENANCE LETTER (OUTPATIENT)
Age: 43
End: 2018-09-29

## 2019-04-29 NOTE — PROGRESS NOTES
MICD Discharge Summary/Instructions     Patient: Jacey Sandoval  MRN: [id number]   : 1975 Age: 42 year old Sex: female   -  Focus of Treatment / Discharge Recommendations    Personal Safety/ Management of Symptoms    * Follow your safety plan.  Report increased symptoms to your care team and /or go to the nearest Emergency Department.  * Call crisis lines as needed  [facility] [phone]                [facility] [phone]  [facility] [phone]              [facility] [phone]  [facility] [phone]              [facility] [phone]  [facility] [phone]          National Suicide Prevention [phone]  [facility] [phone]            Suicide Prevention [phone]  [facility] [phone]    Abstinence/Relapse Prevention  * Take all medicines as directed.  Carry a current list of medicines with you.  * Use coping skills: nutrition, rest, exercise, spirituality, journal, meditation, seek sober support, engage in activities you enjoy  * Do not use illicit (street) drugs, controlled substances (narcotics) or alcohol.    Develop/Improve Independent Living/Socialization Skills: ensure living environment is conducive to recovery    Community Resources/Supports and Discharge Planning:  Maintain abstinence from all mood altering substances, attend 2+ 12 step meetings per week, maintain contact with a program sponsor, enter the outpatient program at [facility] and follow recommendations, continue to work for resolution with the court system in relation to your youngest daughter, maintain contact with your  and follow directives of the courts, continue with 1.1 therapy and psychiatric care at [facility], work with [facility] for seeking an [facility] worker, maintain good physical and mental health care.   Follow up with psychiatrist / main caregiver: [facility]    Next  HPI:  History obtained by patient's girlfriend and chart from [facility], patient is unresponsive:     47 y/o male with no significant PMHx presents to [facility] with AMS, left side weakness and numbness. Per girlfriend, [place] subway [place] from work when he developed acute onset of left facial numbness and LUE and numbness around 6:30PM. When get got home he developed AMS, dysarthria, and weakness in the LUE and LLE. At [facility] ED patient had several episodes of emesis and was hypertensive to SBP in the 200's. Work up revealed right thalamic hemorrhage on CT head. Patient not on any anticoagulation use per girlfriend. Of note, patient took Excedrin for headache at home. Patient transferred to [facility] for further management. (06 Apr 2019 00:04)  Hospital course:   4/7: overnight, patient requiring large amount of cardene and propofol.  Plan today is to transition to precedex, start PO lisinopril and wean of cardene.  Becomes extremely agitated when off sedation.  4/8: Cerebral angio without findings of aneurysm. EVD stopped working, pulled back without improvement. CT Head performed.  4/9: Received ativan 6mg for agitation. EVD stopped draining at 6pm yesterday taken for stat CTH. Dr. So notified and EVD lowered to 5cmH2O without output.   4/10: s/p penumbra evacuation of ICH. Pt seen and examined at bedside postop. Remains intubated, on versed. ICPs stable.  (OR EBL 30cc, received LR 200cc)  4/11: JUSTIN overnight. Remains on versed. Remains intubated. Neuro stable.  4/12: Given ativan overnight, bucking the vent. Remains on versed drip. Neuro stable. ICPs stable. Increase hydralazine to 75 Q8.   4/13 JUSTIN overnight, febrile, urinary retention Straight Cath x 1 = 1L, neuro exam unchanged since yesterday, started Cardene gtt as per Dr. So goal -140  4/14 POD#4 febrile overnight, o/w JUSTIN overnight, EVD@ 5cm H2O, Clonidine started, Ceftriax until 4/19 Staph sputum, Versed/Precedex, straight cathed overnight  4/15: ICP stable. neuro stable. Remains on versed/precedex  4/16: Tolerating CPAP, CTH performed, decreased vent size, EVD raised to 10 cmH2O neuro stable   4/18:  JUSTIN overnight, EVD @15cm H2O, CPAP overnight, Ceftriax until 4/19 PNA, Precedex PRN comfort, rectal tube - diarrhea; fever spike   4/19 EVD clamped since 4/18, ICPs fluctuate, HCT today, R IJ, Ceftraix until 4/19, Versed gtt, neuro exam is improving, Duplex Renal artery and Metanephrine & Catecholamine Urine collection 24hrs for high BP work-up  4/20: EVD removed yesterday. Neuro stable. On precedex  4/21: Neuro stable.  remained on trach collar , no acute events  4/22 JUSTIN overnight, frquent suctioning q1h, on Trach collar, TFs via PEG, Ceftriax until 4/13 4/23: suctioning requirements down, JUSTIN overnight  4/24: no issues overnight. suctioning q2-4 hours, able to cough up secretions on own. Ceftriaxone switched to cefepime yesterday, final sensitivities pending. Staph aureus and enterobacter cloacae growing in sputum cultures.   Cefepime then switched to meropenum due to sensitivities and growing MSSA in sputum  4/26: JUSTIN overnight, neuro stable  4/27: JUSTIN overnight.  4/28: JUSTIN overnight. Neuro stable.  4/29: JUTSIN. Needs re-auth for AR due to abx changed.      Vital Signs Last 24 Hrs  T(C): 36.1 (28 Apr 2019 21:58), Max: 36.8 (28 Apr 2019 05:05)  T(F): 96.9 (28 Apr 2019 21:58), Max: 98.2 (28 Apr 2019 05:05)  HR: 92 (29 Apr 2019 00:49) (78 - 98)  BP: 132/91 (29 Apr 2019 00:49) (103/71 - 138/93)  BP(mean): 110 (28 Apr 2019 20:40) (82 - 110)  RR: 17 (29 Apr 2019 00:49) (17 - 20)  SpO2: 96% (29 Apr 2019 00:49) (96% - 99%)    I&O's Summary    27 Apr 2019 07:01  -  28 Apr 2019 07:00  --------------------------------------------------------  IN: 0 mL / OUT: 1600 mL / NET: -1600 mL    28 Apr 2019 07:01  -  29 Apr 2019 00:59  --------------------------------------------------------  IN: 0 mL / OUT: 975 mL / NET: -975 mL      PHYSICAL EXAM:  Neurological: trach collar, able to mouth words appropriately and nods / shakes head appropriately to questions. NAD, FC on right  CN II-XII: EOMI, PERRL, left facial droop, tongue midline   Motor: Moving R side 5/5, L side w/d to noxious stimuli  SILT throughout  Incision site: C/D/I, no erythema, edema or purulent drainage, flap sunken.  Cardiovascular: regular rate rhythm  Respiratory: tolerating trach collar   Gastrointestinal: abd soft, NT, ND   Genitourinary: condom cath - voiding   Extremities: no LE edema             LABS:  pending    Allergies    No Known Allergies    Intolerances      MEDICATIONS:  Antibiotics:  meropenem  IVPB 1000 milliGRAM(s) IV Intermittent every 8 hours    Neuro:  acetaminophen    Suspension .. 650 milliGRAM(s) Oral every 6 hours PRN  amantadine 100 milliGRAM(s) Oral two times a day  levETIRAcetam  Solution 1000 milliGRAM(s) Enteral Tube two times a day  ondansetron Injectable 4 milliGRAM(s) IV Push every 6 hours PRN  oxyCODONE    5 mG/acetaminophen 325 mG 1 Tablet(s) Oral every 4 hours PRN  oxyCODONE    5 mG/acetaminophen 325 mG 2 Tablet(s) Oral every 6 hours PRN    Anticoagulation:  enoxaparin Injectable 40 milliGRAM(s) SubCutaneous at bedtime    OTHER:  ALBUTerol    90 MICROgram(s) HFA Inhaler 1 Puff(s) Inhalation every 4 hours  amLODIPine   Tablet 10 milliGRAM(s) Oral daily  atorvastatin 40 milliGRAM(s) Oral at bedtime  cloNIDine 0.2 milliGRAM(s) Oral every 8 hours  glucagon  Injectable 1 milliGRAM(s) IntraMuscular once PRN  influenza   Vaccine 0.5 milliLiter(s) IntraMuscular once  lisinopril 40 milliGRAM(s) Oral daily  metoprolol tartrate 100 milliGRAM(s) Oral every 12 hours  tiotropium 18 MICROgram(s) Capsule 1 Capsule(s) Inhalation daily    IVF:    CULTURES:  Culture Results:   Moderate Enterobacter cloacae complex  Moderate Staphylococcus aureus  No routine respiratory derek Isolated (04-22 @ 16:08)  Culture Results:   No growth at 5 days. (04-18 @ 15:05)    RADIOLOGY & ADDITIONAL TESTS:        HEMORRHAGE STROKE  HEMORRHAGE  Handoff  MEWS Score  ICH (intracerebral hemorrhage)  ICH (intracerebral hemorrhage)  Open tracheostomy  Bronchoscopy, adult  EGD, with PEG  Craniotomy for intracranial hemorrhage  Evacuation of hematoma of face  Angiogram, carotid and cerebral, bilateral    ASSESSMENT:  46y Male w/ ETOH abuse now s/p right thalamic IPH with IVH s/p Emergent Rt EVD placement 4/6/19, s/p Cerebral angiogram neg 4/8/19, new left EVD placed and R EVD removed 4/9/19, now s/p penumbra evacuation of ICH 4/10/19, EVD removed 4/19/19.  Currently being treated for MSSA PNA    PLAN:  - neuro and vital checks  - pain control prn percocet  - cont Keppra  - Continue lopressor 100mg BID, norvasc 10mg daily, and lisinopril 40mg daily, hydralazine 100mg Q8h, Clonidine .2 q8h  - s/p Peg, cont PEG feeds  - Meropenem for pneumonia x7 days  - DVT PROPHYLAXIS: [x] Venodynes [x] Heparin/Lovenox  - PT/OT/OOB    DISPOSITION: stepdown, full code, dispo pending to AR after reauthorization      Assessment:  Present when checked    []  GCS  E   V  M     Heart Failure: []Acute, [] acute on chronic , []chronic  Heart Failure:  [] Diastolic (HFpEF), [] Systolic (HFrEF), []Combined (HFpEF and HFrEF), [] RHF, [] Pulm HTN, [] Other    [] KRISTA, [] ATN, [] AIN, [] other  [] CKD1, [] CKD2, [] CKD 3, [] CKD 4, [] CKD 5, []ESRD    Encephalopathy: [] Metabolic, [] Hepatic, [] toxic, [x] Neurological, [] Other    Abnormal Nurtitional Status: [] malnurtition (see nutrition note), [ ]underweight: BMI < 19, [] morbid obesity: BMI >40, [] Cachexia    [] Sepsis  [] hypovolemic shock,[] cardiogenic shock, [] hemorrhagic shock, [] neuogenic shock  [] Acute Respiratory Failure  []Cerebral edema, [] Brain compression/ herniation,   [] Functional quadriplegia  [] Acute blood loss anemia visit: 11/07/17  Follow up with your therapist: [facility]  Next visit:11/07/17  Go to group therapy and / or support groups at: outpatient program and in home community  See your medical doctor about:  Ongoing physical health care    Client Signature:_______________________   Date / Time:___________  Staff Signature:________________________   Date / Time:___________

## 2023-06-28 ENCOUNTER — TRANSFERRED RECORDS (OUTPATIENT)
Dept: HEALTH INFORMATION MANAGEMENT | Facility: CLINIC | Age: 48
End: 2023-06-28

## 2023-10-19 ENCOUNTER — MEDICAL CORRESPONDENCE (OUTPATIENT)
Dept: HEALTH INFORMATION MANAGEMENT | Facility: CLINIC | Age: 48
End: 2023-10-19
Payer: COMMERCIAL

## 2023-10-20 ENCOUNTER — TRANSCRIBE ORDERS (OUTPATIENT)
Dept: OTHER | Age: 48
End: 2023-10-20

## 2023-10-20 DIAGNOSIS — Z30.8 ENCOUNTER FOR TUBAL LIGATION COUNSELING: Primary | ICD-10-CM

## 2023-10-28 ENCOUNTER — HEALTH MAINTENANCE LETTER (OUTPATIENT)
Age: 48
End: 2023-10-28

## 2023-11-02 ENCOUNTER — OFFICE VISIT (OUTPATIENT)
Dept: OBGYN | Facility: CLINIC | Age: 48
End: 2023-11-02
Payer: COMMERCIAL

## 2023-11-02 ENCOUNTER — PREP FOR PROCEDURE (OUTPATIENT)
Dept: OBGYN | Facility: CLINIC | Age: 48
End: 2023-11-02

## 2023-11-02 ENCOUNTER — TELEPHONE (OUTPATIENT)
Dept: OBGYN | Facility: CLINIC | Age: 48
End: 2023-11-02

## 2023-11-02 VITALS
HEART RATE: 103 BPM | RESPIRATION RATE: 14 BRPM | SYSTOLIC BLOOD PRESSURE: 130 MMHG | DIASTOLIC BLOOD PRESSURE: 84 MMHG | BODY MASS INDEX: 38.1 KG/M2 | WEIGHT: 223.2 LBS | HEIGHT: 64 IN

## 2023-11-02 DIAGNOSIS — Z30.2 ENCOUNTER FOR STERILIZATION: Primary | ICD-10-CM

## 2023-11-02 DIAGNOSIS — Z30.8 ENCOUNTER FOR TUBAL LIGATION COUNSELING: ICD-10-CM

## 2023-11-02 PROCEDURE — 99203 OFFICE O/P NEW LOW 30 MIN: CPT | Performed by: OBSTETRICS & GYNECOLOGY

## 2023-11-02 RX ORDER — ACETAMINOPHEN 325 MG/1
975 TABLET ORAL ONCE
Status: CANCELLED | OUTPATIENT
Start: 2023-11-02 | End: 2023-11-02

## 2023-11-02 RX ORDER — KETOROLAC TROMETHAMINE 30 MG/ML
30 INJECTION, SOLUTION INTRAMUSCULAR; INTRAVENOUS ONCE
Status: CANCELLED | OUTPATIENT
Start: 2023-11-02 | End: 2023-11-02

## 2023-11-02 NOTE — PROGRESS NOTES
[facility]    Gynecology Office Note    CC:   Chief Complaint   Patient presents with    Consult        HPI: Jacey Sandoval is a 48 year old  who presents for sterilization consult.  Patient has been trying to get in for a tubal ligation for a few months now.  Has had some difficulty getting set up with providers and health systems that except her insurance.  She is looking for a permanent contraceptive option, is certain that she does not want to have anymore children.  She has used other contraceptive options in the past, including IUD, and does not want to continue to use these.    Of note, patient is currently experiencing severe back pain. Has established care with orthopedics and possibly undergoing back surgery in the near future.    GYN Hx:       Patient's last menstrual period was 10/26/2023 (approximate).      Last Pap Smear:   Lab Results   Component Value Date    PAP NIL 2010       ROS: A 10 pt ROS was completed and found to be otherwise negative unless mentioned in the HPI.     PMH:   Past Medical History:   Diagnosis Date    Allergic rhinitis due to other allergen     Anxiety     ASC-H pap     Neg colpo    Closed fibular fracture 2012    Depressive disorder, not elsewhere classified     Migraine, unspecified, without mention of intractable migraine without mention of status migrainosus     Sleep disturbance, unspecified     Substance abuse (H)     alcohol abuse.        PSHx:   Past Surgical History:   Procedure Laterality Date    ABDOMEN SURGERY      c- section     SECTION      x2    COLONOSCOPY  2010    GASTRIC BYPASS  2003    Luci-en-Y    ZZC NONSPECIFIC PROCEDURE  2002    Childbirth       OBHx:   OB History    Para Term  AB Living   1 0 0 0 0 1   SAB IAB Ectopic Multiple Live Births   0 0 0 0 0      # Outcome Date GA Lbr Kian/2nd Weight Sex Delivery Anes PTL Lv   1                 Medications:   No  current outpatient medications on file prior to visit.  Self Administer Medications: Behavior[facility] Services        Allergies:      Allergies   Allergen Reactions    Zofran [Ondansetron Hcl] Other (See Comments)    Tape [Adhesive Tape] Rash       Social History:   Social History     Socioeconomic History    Marital status: Patient Declined     Spouse name: Not on file    Number of children: 1    Years of education: Not on file    Highest education level: Not on file   Occupational History     Employer: UNEMPLOYED   Tobacco Use    Smoking status: Never    Smokeless tobacco: Never   Substance and Sexual Activity    Alcohol use: No     Comment:  in AA currently 3/11/2013 - currently in tx    Drug use: No    Sexual activity: Yes     Partners: Male   Other Topics Concern     Service No     Comment:     Blood Transfusions No    Caffeine Concern Yes     Comment: ha    Occupational Exposure No    Hobby Hazards No    Sleep Concern Yes    Stress Concern Yes    Weight Concern Yes    Special Diet No    Back Care Yes    Exercise Not Asked    Bike Helmet No    Seat Belt Yes    Self-Exams No    Parent/sibling w/ CABG, MI or angioplasty before 65F 55M? Not Asked   Social History Narrative    Living in [facility]. Daughter is 12 and not living with her.      Social Determinants of Health     Financial Resource Strain: Not on file   Food Insecurity: Not on file   Transportation Needs: Not on file   Physical Activity: Not on file   Stress: Not on file   Social Connections: Not on file   Interpersonal Safety: Not on file   Housing Stability: Not on file         Family History:   Family History   Problem Relation Age of Onset    Connective Tissue Disorder Mother         ms    Unknown/Adopted Mother     Depression Mother     Bipolar Disorder Mother     Substance Abuse Mother     Dementia Mother     Unknown/Adopted Father     Depression Father        Physical Exam:   Vitals:    11/02/23 1044   BP: 130/84   BP  "Location: Right arm   Patient Position: Sitting   Cuff Size: Adult Regular   Pulse: 103   Resp: 14   Weight: 101.2 kg (223 lb 3.2 oz)   Height: 1.626 m (5' 4\")      Estimated body mass index is 38.31 kg/m  as calculated from the following:    Height as of this encounter: 1.626 m (5' 4\").    Weight as of this encounter: 101.2 kg (223 lb 3.2 oz).    General appearance: well-hydrated, A&O x 3, no apparent distress  Lungs: Equal expansion bilaterally, no accessory muscle use  Heart: No heaves or thrills.   Constitutional: See vitals  Neuro: CN II-XII grossly intact  Genitourinary:  Declines exam today    Assessment and Plan:     Encounter Diagnosis   Name Primary?    Encounter for tubal ligation counseling      Patient is desiring tubal ligation.  Is certain she does not desire future childbearing.  She has been counseled on other contraceptive options and declines.  She is aware that the procedure is permanent and not reversible.  Discussed surgical approaches of laparoscopic or vaginal, patient desires to proceed with laparoscopic bilateral salpingectomy. Discussed surgical risks, benefits, alternatives, and expected post operative course. Patient agreeable with plan.     Patient requires federal tubal papers due to insurance.  These were signed today.  We do have a signed form on file from July 2023.  Patient has been seen by a few other providers for sterilization consult, has had difficulties getting set up for surgery with the right health system that would be covered by her insurance.    Health maintenance: due for pap smear, declines today due to back pain. Agreeable to getting this under anesthesia.     Return to clinic post operatively.    Antonia Barbour DO            "

## 2023-11-02 NOTE — TELEPHONE ENCOUNTER
"[id number]  Jaceyjason Sandoval    You are now scheduled for surgery at [facility].  Below are the details for your surgery.  Please read the \"Preparing for Your Surgery\" instructions and let us know if you have any questions.    Type of surgery: SALPINGECTOMY, LAPAROSCOPIC (Bilateral)   Surgeon:  Antonia Barbour DO  Location of surgery: [facility] OR    Date of surgery: 11/8/23    Time: 10:30am   Arrival Time: 9:30am    Time can change, to be confirmed a couple of days prior by pre-op surgery nurse.    Pre-Op Appt Date: Patient to schedule with a PCP or Family Practice Provider within 30 days to the surgery.  Post-Op Appt Date:    Time:     Packet sent out: Yes  Pre-cert/Authorization completed:  TBD by Financial Securing Office.   MA Sterilization/Hysterectomy Acknowledgment Consent signed: Yes  July 2023 scanned in records from outside clinic dated   10/5/23.    [facility]  [phone]    Fax: [phone]  Same Day Surgery [phone]  Fax: [phone]  [facility] [phone]    "

## 2023-11-02 NOTE — NURSING NOTE
"Initial /84 (BP Location: Right arm, Patient Position: Sitting, Cuff Size: Adult Regular)   Pulse 103   Resp 14   Ht 1.626 m (5' 4\")   Wt 101.2 kg (223 lb 3.2 oz)   LMP 10/26/2023 (Approximate)   BMI 38.31 kg/m   Estimated body mass index is 38.31 kg/m  as calculated from the following:    Height as of this encounter: 1.626 m (5' 4\").    Weight as of this encounter: 101.2 kg (223 lb 3.2 oz). .    "

## 2023-11-06 ENCOUNTER — ANESTHESIA EVENT (OUTPATIENT)
Dept: SURGERY | Facility: CLINIC | Age: 48
End: 2023-11-06
Payer: COMMERCIAL

## 2023-11-06 NOTE — ANESTHESIA PREPROCEDURE EVALUATION
Anesthesia Pre-Procedure Evaluation    Patient: Jacey Sandoval   MRN: [id number] : 1975        Procedure : Procedure(s):  SALPINGECTOMY, LAPAROSCOPIC          Past Medical History:   Diagnosis Date     Allergic rhinitis due to other allergen      Anxiety      ASC-H pap 2006    Neg colpo     Closed fibular fracture 2012     Depressive disorder, not elsewhere classified      Migraine, unspecified, without mention of intractable migraine without mention of status migrainosus      Sleep disturbance, unspecified      Substance abuse (H)     alcohol abuse.       Past Surgical History:   Procedure Laterality Date     ABDOMEN SURGERY      c- section      SECTION      x2     COLONOSCOPY  2010     GASTRIC BYPASS  2003    Luci-en-Y     ZZC NONSPECIFIC PROCEDURE  2002    Childbirth      Allergies   Allergen Reactions     Zofran [Ondansetron Hcl] Other (See Comments)     Tape [Adhesive Tape] Rash      Social History     Tobacco Use     Smoking status: Never     Smokeless tobacco: Never   Substance Use Topics     Alcohol use: No     Comment:  in AA currently 3/11/2013 - currently in tx      Wt Readings from Last 1 Encounters:   23 101.2 kg (223 lb 3.2 oz)        Anesthesia Evaluation   Pt has had prior anesthetic. Type: MAC and General.        ROS/MED HX  ENT/Pulmonary:     (+)           allergic rhinitis,                            Neurologic:     (+)      migraines,                          Cardiovascular:  - neg cardiovascular ROS     METS/Exercise Tolerance:     Hematologic:     (+)      anemia,          Musculoskeletal:       GI/Hepatic:  - neg GI/hepatic ROS     Renal/Genitourinary:  - neg Renal ROS     Endo:     (+)               Obesity,       Psychiatric/Substance Use:     (+) psychiatric history depression, anxiety and other (comment) (PTSD) alcohol abuse      Infectious Disease:  - neg infectious disease ROS     Malignancy:  - neg malignancy ROS     Other:      (+)  ,  H/O Chronic Pain,         Physical Exam    Airway  airway exam normal      Mallampati: II   TM distance: > 3 FB   Neck ROM: full   Mouth opening: > 3 cm    Respiratory Devices and Support         Dental       (+) Minor Abnormalities - some fillings, tiny chips      Cardiovascular   cardiovascular exam normal          Pulmonary   pulmonary exam normal        breath sounds clear to auscultation       OUTSIDE LABS:  CBC:   Lab Results   Component Value Date    WBC 9.2 09/13/2012    WBC 6.5 06/03/2012    HGB 16.3 (H) 09/13/2012    HGB 10.2 (L) 06/03/2012    HCT 46.9 09/13/2012    HCT 32.1 (L) 06/03/2012     09/13/2012     06/03/2012     BMP:   Lab Results   Component Value Date     05/11/2015     09/13/2012    POTASSIUM 4.2 05/11/2015    POTASSIUM 3.7 09/13/2012    CHLORIDE 107 05/11/2015    CHLORIDE 101 09/13/2012    CO2 22 05/11/2015    CO2 21 09/13/2012    BUN 9 05/11/2015    BUN 16 09/13/2012    CR 0.71 05/11/2015    CR 0.69 09/13/2012    GLC 65 (L) 05/11/2015     (H) 09/13/2012     COAGS:   Lab Results   Component Value Date    PTT 30 09/13/2012    INR 1.05 09/13/2012     POC:   Lab Results   Component Value Date    HCG Negative 03/03/2013    HCGS Negative 09/18/2002     HEPATIC:   Lab Results   Component Value Date    ALBUMIN 4.7 09/13/2012    PROTTOTAL 7.9 09/13/2012    ALT 35 09/13/2012    AST 50 (H) 09/13/2012    ALKPHOS 78 09/13/2012    BILITOTAL 0.5 09/13/2012     OTHER:   Lab Results   Component Value Date    LACT 0.8 06/01/2012    MYNOR 8.7 05/11/2015       Anesthesia Plan    ASA Status:  3    NPO Status:  NPO Appropriate    Anesthesia Type: General.     - Airway: ETT   Induction: Intravenous.   Maintenance: Inhalation.        Consents    Anesthesia Plan(s) and associated risks, benefits, and realistic alternatives discussed. Questions answered and patient/representative(s) expressed understanding.     - Discussed:     - Discussed with:  Patient            Postoperative  Care    Pain management: Multi-modal analgesia.   PONV prophylaxis: Ondansetron (or other 5HT-3), Dexamethasone or Solumedrol     Comments:              ELIAS Villela CRNA

## 2023-11-08 ENCOUNTER — HOSPITAL ENCOUNTER (OUTPATIENT)
Facility: CLINIC | Age: 48
Discharge: HOME | End: 2023-11-08
Attending: OBSTETRICS & GYNECOLOGY | Admitting: OBSTETRICS & GYNECOLOGY
Payer: COMMERCIAL

## 2023-11-08 ENCOUNTER — ANESTHESIA (OUTPATIENT)
Dept: SURGERY | Facility: CLINIC | Age: 48
End: 2023-11-08
Payer: COMMERCIAL

## 2023-11-08 VITALS
SYSTOLIC BLOOD PRESSURE: 120 MMHG | TEMPERATURE: 97.4 F | OXYGEN SATURATION: 95 % | DIASTOLIC BLOOD PRESSURE: 75 MMHG | HEART RATE: 83 BPM | BODY MASS INDEX: 38.41 KG/M2 | RESPIRATION RATE: 14 BRPM | WEIGHT: 225 LBS | HEIGHT: 64 IN

## 2023-11-08 DIAGNOSIS — Z90.79 STATUS POST BILATERAL SALPINGECTOMY: Primary | ICD-10-CM

## 2023-11-08 DIAGNOSIS — Z30.2 ENCOUNTER FOR STERILIZATION: ICD-10-CM

## 2023-11-08 LAB
BASOPHILS # BLD AUTO: 0.1 10E3/UL (ref 0–0.2)
BASOPHILS NFR BLD AUTO: 1 %
EOSINOPHIL # BLD AUTO: 0.1 10E3/UL (ref 0–0.7)
EOSINOPHIL NFR BLD AUTO: 2 %
ERYTHROCYTE [DISTWIDTH] IN BLOOD BY AUTOMATED COUNT: 14.6 % (ref 10–15)
HCG UR QL: NEGATIVE
HCT VFR BLD AUTO: 40.3 % (ref 35–47)
HGB BLD-MCNC: 13.2 G/DL (ref 11.7–15.7)
IMM GRANULOCYTES # BLD: 0.1 10E3/UL
IMM GRANULOCYTES NFR BLD: 1 %
LYMPHOCYTES # BLD AUTO: 2.2 10E3/UL (ref 0.8–5.3)
LYMPHOCYTES NFR BLD AUTO: 32 %
MCH RBC QN AUTO: 28.3 PG (ref 26.5–33)
MCHC RBC AUTO-ENTMCNC: 32.8 G/DL (ref 31.5–36.5)
MCV RBC AUTO: 87 FL (ref 78–100)
MONOCYTES # BLD AUTO: 0.3 10E3/UL (ref 0–1.3)
MONOCYTES NFR BLD AUTO: 5 %
NEUTROPHILS # BLD AUTO: 4.2 10E3/UL (ref 1.6–8.3)
NEUTROPHILS NFR BLD AUTO: 59 %
NRBC # BLD AUTO: 0 10E3/UL
NRBC BLD AUTO-RTO: 0 /100
PLATELET # BLD AUTO: 277 10E3/UL (ref 150–450)
RBC # BLD AUTO: 4.66 10E6/UL (ref 3.8–5.2)
WBC # BLD AUTO: 6.9 10E3/UL (ref 4–11)

## 2023-11-08 PROCEDURE — 88302 TISSUE EXAM BY PATHOLOGIST: CPT | Mod: TC | Performed by: OBSTETRICS & GYNECOLOGY

## 2023-11-08 PROCEDURE — 81025 URINE PREGNANCY TEST: CPT | Performed by: OBSTETRICS & GYNECOLOGY

## 2023-11-08 PROCEDURE — 58661 LAPAROSCOPY REMOVE ADNEXA: CPT | Mod: 50 | Performed by: OBSTETRICS & GYNECOLOGY

## 2023-11-08 PROCEDURE — 250N000011 HC RX IP 250 OP 636: Performed by: NURSE ANESTHETIST, CERTIFIED REGISTERED

## 2023-11-08 PROCEDURE — 88302 TISSUE EXAM BY PATHOLOGIST: CPT | Mod: 26 | Performed by: PATHOLOGY

## 2023-11-08 PROCEDURE — 250N000013 HC RX MED GY IP 250 OP 250 PS 637: Performed by: NURSE ANESTHETIST, CERTIFIED REGISTERED

## 2023-11-08 PROCEDURE — 360N000077 HC SURGERY LEVEL 4, PER MIN: Performed by: OBSTETRICS & GYNECOLOGY

## 2023-11-08 PROCEDURE — 250N000025 HC SEVOFLURANE, PER MIN: Performed by: OBSTETRICS & GYNECOLOGY

## 2023-11-08 PROCEDURE — 36415 COLL VENOUS BLD VENIPUNCTURE: CPT | Performed by: OBSTETRICS & GYNECOLOGY

## 2023-11-08 PROCEDURE — 250N000013 HC RX MED GY IP 250 OP 250 PS 637: Performed by: OBSTETRICS & GYNECOLOGY

## 2023-11-08 PROCEDURE — 710N000012 HC RECOVERY PHASE 2, PER MINUTE: Performed by: OBSTETRICS & GYNECOLOGY

## 2023-11-08 PROCEDURE — 258N000003 HC RX IP 258 OP 636: Performed by: NURSE ANESTHETIST, CERTIFIED REGISTERED

## 2023-11-08 PROCEDURE — 710N000009 HC RECOVERY PHASE 1, LEVEL 1, PER MIN: Performed by: OBSTETRICS & GYNECOLOGY

## 2023-11-08 PROCEDURE — 250N000009 HC RX 250: Performed by: NURSE ANESTHETIST, CERTIFIED REGISTERED

## 2023-11-08 PROCEDURE — 250N000011 HC RX IP 250 OP 636: Performed by: OBSTETRICS & GYNECOLOGY

## 2023-11-08 PROCEDURE — 370N000017 HC ANESTHESIA TECHNICAL FEE, PER MIN: Performed by: OBSTETRICS & GYNECOLOGY

## 2023-11-08 PROCEDURE — 272N000001 HC OR GENERAL SUPPLY STERILE: Performed by: OBSTETRICS & GYNECOLOGY

## 2023-11-08 PROCEDURE — 271N000001 HC OR GENERAL SUPPLY NON-STERILE: Performed by: OBSTETRICS & GYNECOLOGY

## 2023-11-08 PROCEDURE — 85025 COMPLETE CBC W/AUTO DIFF WBC: CPT | Performed by: OBSTETRICS & GYNECOLOGY

## 2023-11-08 PROCEDURE — 999N000141 HC STATISTIC PRE-PROCEDURE NURSING ASSESSMENT: Performed by: OBSTETRICS & GYNECOLOGY

## 2023-11-08 PROCEDURE — G0145 SCR C/V CYTO,THINLAYER,RESCR: HCPCS | Performed by: OBSTETRICS & GYNECOLOGY

## 2023-11-08 RX ORDER — NALOXONE HYDROCHLORIDE 0.4 MG/ML
0.4 INJECTION, SOLUTION INTRAMUSCULAR; INTRAVENOUS; SUBCUTANEOUS
Status: DISCONTINUED | OUTPATIENT
Start: 2023-11-08 | End: 2023-11-08 | Stop reason: HOSPADM

## 2023-11-08 RX ORDER — FENTANYL CITRATE 50 UG/ML
INJECTION, SOLUTION INTRAMUSCULAR; INTRAVENOUS PRN
Status: DISCONTINUED | OUTPATIENT
Start: 2023-11-08 | End: 2023-11-08

## 2023-11-08 RX ORDER — DEXAMETHASONE SODIUM PHOSPHATE 4 MG/ML
INJECTION, SOLUTION INTRA-ARTICULAR; INTRALESIONAL; INTRAMUSCULAR; INTRAVENOUS; SOFT TISSUE PRN
Status: DISCONTINUED | OUTPATIENT
Start: 2023-11-08 | End: 2023-11-08

## 2023-11-08 RX ORDER — OXYCODONE HYDROCHLORIDE 5 MG/1
5 TABLET ORAL
Status: COMPLETED | OUTPATIENT
Start: 2023-11-08 | End: 2023-11-08

## 2023-11-08 RX ORDER — ACETAMINOPHEN 325 MG/1
975 TABLET ORAL ONCE
Status: COMPLETED | OUTPATIENT
Start: 2023-11-08 | End: 2023-11-08

## 2023-11-08 RX ORDER — PROPOFOL 10 MG/ML
INJECTION, EMULSION INTRAVENOUS PRN
Status: DISCONTINUED | OUTPATIENT
Start: 2023-11-08 | End: 2023-11-08

## 2023-11-08 RX ORDER — GABAPENTIN 300 MG/1
300 CAPSULE ORAL
Status: DISCONTINUED | OUTPATIENT
Start: 2023-11-08 | End: 2023-11-08

## 2023-11-08 RX ORDER — KETAMINE HYDROCHLORIDE 10 MG/ML
INJECTION INTRAMUSCULAR; INTRAVENOUS PRN
Status: DISCONTINUED | OUTPATIENT
Start: 2023-11-08 | End: 2023-11-08

## 2023-11-08 RX ORDER — IBUPROFEN 800 MG/1
800 TABLET, FILM COATED ORAL EVERY 6 HOURS PRN
Qty: 30 TABLET | Refills: 0 | Status: SHIPPED | OUTPATIENT
Start: 2023-11-08

## 2023-11-08 RX ORDER — IBUPROFEN 400 MG/1
800 TABLET, FILM COATED ORAL ONCE
Qty: 2 TABLET | Refills: 0 | Status: DISCONTINUED | OUTPATIENT
Start: 2023-11-08 | End: 2023-11-08 | Stop reason: HOSPADM

## 2023-11-08 RX ORDER — NALOXONE HYDROCHLORIDE 0.4 MG/ML
0.2 INJECTION, SOLUTION INTRAMUSCULAR; INTRAVENOUS; SUBCUTANEOUS
Status: DISCONTINUED | OUTPATIENT
Start: 2023-11-08 | End: 2023-11-08 | Stop reason: HOSPADM

## 2023-11-08 RX ORDER — KETOROLAC TROMETHAMINE 30 MG/ML
INJECTION, SOLUTION INTRAMUSCULAR; INTRAVENOUS PRN
Status: DISCONTINUED | OUTPATIENT
Start: 2023-11-08 | End: 2023-11-08

## 2023-11-08 RX ORDER — KETOROLAC TROMETHAMINE 30 MG/ML
30 INJECTION, SOLUTION INTRAMUSCULAR; INTRAVENOUS ONCE
Status: COMPLETED | OUTPATIENT
Start: 2023-11-08 | End: 2023-11-08

## 2023-11-08 RX ORDER — HYDROXYZINE HYDROCHLORIDE 25 MG/1
25 TABLET, FILM COATED ORAL EVERY 6 HOURS PRN
Status: DISCONTINUED | OUTPATIENT
Start: 2023-11-08 | End: 2023-11-08 | Stop reason: HOSPADM

## 2023-11-08 RX ORDER — ACETAMINOPHEN 325 MG/1
975 TABLET ORAL ONCE
Status: DISCONTINUED | OUTPATIENT
Start: 2023-11-08 | End: 2023-11-08

## 2023-11-08 RX ORDER — GLYCOPYRROLATE 0.2 MG/ML
INJECTION, SOLUTION INTRAMUSCULAR; INTRAVENOUS PRN
Status: DISCONTINUED | OUTPATIENT
Start: 2023-11-08 | End: 2023-11-08

## 2023-11-08 RX ORDER — ACETAMINOPHEN 325 MG/1
975 TABLET ORAL ONCE
Qty: 3 TABLET | Refills: 0 | Status: DISCONTINUED | OUTPATIENT
Start: 2023-11-08 | End: 2023-11-08 | Stop reason: HOSPADM

## 2023-11-08 RX ORDER — SODIUM CHLORIDE, SODIUM LACTATE, POTASSIUM CHLORIDE, CALCIUM CHLORIDE 600; 310; 30; 20 MG/100ML; MG/100ML; MG/100ML; MG/100ML
INJECTION, SOLUTION INTRAVENOUS CONTINUOUS
Status: DISCONTINUED | OUTPATIENT
Start: 2023-11-08 | End: 2023-11-08 | Stop reason: HOSPADM

## 2023-11-08 RX ORDER — ALBUTEROL SULFATE 0.83 MG/ML
2.5 SOLUTION RESPIRATORY (INHALATION) EVERY 4 HOURS PRN
Status: DISCONTINUED | OUTPATIENT
Start: 2023-11-08 | End: 2023-11-08 | Stop reason: HOSPADM

## 2023-11-08 RX ORDER — FENTANYL CITRATE 50 UG/ML
50 INJECTION, SOLUTION INTRAMUSCULAR; INTRAVENOUS EVERY 5 MIN PRN
Status: DISCONTINUED | OUTPATIENT
Start: 2023-11-08 | End: 2023-11-08 | Stop reason: HOSPADM

## 2023-11-08 RX ORDER — BUPIVACAINE HYDROCHLORIDE 2.5 MG/ML
INJECTION, SOLUTION INFILTRATION; PERINEURAL PRN
Status: DISCONTINUED | OUTPATIENT
Start: 2023-11-08 | End: 2023-11-08 | Stop reason: HOSPADM

## 2023-11-08 RX ORDER — AMOXICILLIN 250 MG
1-2 CAPSULE ORAL 2 TIMES DAILY PRN
Qty: 30 TABLET | Refills: 0 | Status: SHIPPED | OUTPATIENT
Start: 2023-11-08

## 2023-11-08 RX ORDER — LIDOCAINE 40 MG/G
CREAM TOPICAL
Status: DISCONTINUED | OUTPATIENT
Start: 2023-11-08 | End: 2023-11-08 | Stop reason: HOSPADM

## 2023-11-08 RX ORDER — OXYCODONE HYDROCHLORIDE 5 MG/1
5 TABLET ORAL EVERY 6 HOURS PRN
Qty: 12 TABLET | Refills: 0 | Status: SHIPPED | OUTPATIENT
Start: 2023-11-08

## 2023-11-08 RX ORDER — HYDROMORPHONE HCL IN WATER/PF 6 MG/30 ML
0.4 PATIENT CONTROLLED ANALGESIA SYRINGE INTRAVENOUS EVERY 5 MIN PRN
Status: DISCONTINUED | OUTPATIENT
Start: 2023-11-08 | End: 2023-11-08 | Stop reason: HOSPADM

## 2023-11-08 RX ORDER — PROPOFOL 10 MG/ML
INJECTION, EMULSION INTRAVENOUS CONTINUOUS PRN
Status: DISCONTINUED | OUTPATIENT
Start: 2023-11-08 | End: 2023-11-08

## 2023-11-08 RX ADMIN — GLYCOPYRROLATE 0.2 MG: 0.2 INJECTION, SOLUTION INTRAMUSCULAR; INTRAVENOUS at 11:14

## 2023-11-08 RX ADMIN — SODIUM CHLORIDE, POTASSIUM CHLORIDE, SODIUM LACTATE AND CALCIUM CHLORIDE: 600; 310; 30; 20 INJECTION, SOLUTION INTRAVENOUS at 12:05

## 2023-11-08 RX ADMIN — SODIUM CHLORIDE, POTASSIUM CHLORIDE, SODIUM LACTATE AND CALCIUM CHLORIDE 1000 ML: 600; 310; 30; 20 INJECTION, SOLUTION INTRAVENOUS at 10:19

## 2023-11-08 RX ADMIN — ROCURONIUM BROMIDE 10 MG: 50 INJECTION, SOLUTION INTRAVENOUS at 11:34

## 2023-11-08 RX ADMIN — DEXAMETHASONE SODIUM PHOSPHATE 10 MG: 4 INJECTION, SOLUTION INTRA-ARTICULAR; INTRALESIONAL; INTRAMUSCULAR; INTRAVENOUS; SOFT TISSUE at 11:11

## 2023-11-08 RX ADMIN — PROPOFOL 200 MG: 10 INJECTION, EMULSION INTRAVENOUS at 11:11

## 2023-11-08 RX ADMIN — KETAMINE HYDROCHLORIDE 10 MG: 10 INJECTION INTRAMUSCULAR; INTRAVENOUS at 11:56

## 2023-11-08 RX ADMIN — FENTANYL CITRATE 25 MCG: 50 INJECTION INTRAMUSCULAR; INTRAVENOUS at 12:46

## 2023-11-08 RX ADMIN — KETAMINE HYDROCHLORIDE 30 MG: 10 INJECTION INTRAMUSCULAR; INTRAVENOUS at 11:20

## 2023-11-08 RX ADMIN — ROCURONIUM BROMIDE 50 MG: 50 INJECTION, SOLUTION INTRAVENOUS at 11:11

## 2023-11-08 RX ADMIN — KETOROLAC TROMETHAMINE 30 MG: 30 INJECTION INTRAMUSCULAR; INTRAVENOUS at 10:19

## 2023-11-08 RX ADMIN — FENTANYL CITRATE 50 MCG: 50 INJECTION INTRAMUSCULAR; INTRAVENOUS at 12:00

## 2023-11-08 RX ADMIN — PHENYLEPHRINE HYDROCHLORIDE 100 MCG: 10 INJECTION INTRAVENOUS at 11:48

## 2023-11-08 RX ADMIN — FENTANYL CITRATE 50 MCG: 50 INJECTION INTRAMUSCULAR; INTRAVENOUS at 11:39

## 2023-11-08 RX ADMIN — SODIUM CHLORIDE, POTASSIUM CHLORIDE, SODIUM LACTATE AND CALCIUM CHLORIDE: 600; 310; 30; 20 INJECTION, SOLUTION INTRAVENOUS at 11:11

## 2023-11-08 RX ADMIN — MIDAZOLAM 2 MG: 1 INJECTION INTRAMUSCULAR; INTRAVENOUS at 11:01

## 2023-11-08 RX ADMIN — FENTANYL CITRATE 50 MCG: 50 INJECTION INTRAMUSCULAR; INTRAVENOUS at 12:59

## 2023-11-08 RX ADMIN — KETOROLAC TROMETHAMINE 30 MG: 30 INJECTION, SOLUTION INTRAMUSCULAR at 12:18

## 2023-11-08 RX ADMIN — KETAMINE HYDROCHLORIDE 10 MG: 10 INJECTION INTRAMUSCULAR; INTRAVENOUS at 11:42

## 2023-11-08 RX ADMIN — OXYCODONE HYDROCHLORIDE 5 MG: 5 TABLET ORAL at 13:30

## 2023-11-08 RX ADMIN — FENTANYL CITRATE 100 MCG: 50 INJECTION INTRAMUSCULAR; INTRAVENOUS at 11:01

## 2023-11-08 RX ADMIN — PROPOFOL 25 MCG/KG/MIN: 10 INJECTION, EMULSION INTRAVENOUS at 11:16

## 2023-11-08 RX ADMIN — ACETAMINOPHEN 975 MG: 325 TABLET, FILM COATED ORAL at 10:06

## 2023-11-08 ASSESSMENT — ACTIVITIES OF DAILY LIVING (ADL)
ADLS_ACUITY_SCORE: 35
ADLS_ACUITY_SCORE: 35

## 2023-11-08 NOTE — DISCHARGE INSTRUCTIONS
Same Day Surgery Discharge Instructions  Special Precautions After Surgery - Adult    It is not unusual to feel lightheaded or faint, up to 24 hours after surgery or while taking pain medication.  If you have these symptoms; sit for a few minutes before standing and have someone assist you when getting up.  You should rest and relax for the next 24 hours and must have someone stay with you for at least 24 hours after your discharge.  DO NOT DRIVE any vehicle or operate mechanical equipment for 24 hours following the end of your surgery.  DO NOT DRIVE while taking narcotic pain medications that have been prescribed by your physician.  If you had a limb operated on, you must be able to use it fully to drive.  DO NOT drink alcoholic beverages for 24 hours following surgery or while taking prescription pain medication.  Drink clear liquids (apple juice, ginger ale, broth, 7-Up, etc.).  Progress to your regular diet as you feel able.  Any questions call your physician and do not make important decisions for 24 hours.    ACTIVITY  Rest today.  No activity or diet restrictions.  Resume activity as tolerated.     INCISIONAL CARE  Apply ice 1/2 hour on and 1/2 hour off while awake.    Medications:  Acetaminophen (Tylenol):  lastt dose: 10 next dose as.needed   Oxycodone :  Next dose: 1:30.  Ibuprofen (Motrin, Advil):  last  dose: 1030.  Follow the instructions on the bottle.    __________________________________________________________________________________________________________________________________  IMPORTANT NUMBERS:    [facility] Main Number:  [phone], [phone]  Pharmacy:  [phone]  Same Day Surgery:  [phone], Monday - Friday until 8:30 p.m.    OB Clinic:  [phone]   Nurse Advice Line: [phone]

## 2023-11-08 NOTE — BRIEF OP NOTE
[facility]    Brief Operative Note    Pre-operative diagnosis: Encounter for sterilization [Z30.2]  Post-operative diagnosis S/p laparoscopic bilateral salpingectomy    Procedure: SALPINGECTOMY, LAPAROSCOPIC bilateral, Bilateral - Abdomen    Surgeon: Surgeon(s) and Role:     * Antonia Barbour DO - Primary     * Lilia Gorman MD - Assisting     * Harish Lopez DO - Assisting  Anesthesia: General   Estimated Blood Loss: 15cc    Drains: None  Specimens:   ID Type Source Tests Collected by Time Destination   1 : cervical pap smear Brushing Cervix HPV HIGH RISK TYPES DNA CERVICAL, GYNECOLOGIC CYTOLOGY Antonia Barbour DO 11/8/2023 11:33 AM    2 :  Tissue Fallopian Tube, Bilateral SURGICAL PATHOLOGY EXAM Antonia Barbour DO 11/8/2023 12:03 PM      Findings:   Normal appearing uterus, bilateral fallopian tubes and ovaries .  Complications: None.  Implants: * No implants in log *        Antonia Barbour DO

## 2023-11-08 NOTE — ANESTHESIA POSTPROCEDURE EVALUATION
Patient: Jacey Sandoval    Procedure: Procedure(s):  SALPINGECTOMY, LAPAROSCOPIC bilateral       Anesthesia Type:  General    Note:  Disposition: Outpatient   Postop Pain Control: Uneventful            Sign Out: Well controlled pain   PONV: No   Neuro/Psych: Uneventful            Sign Out: Acceptable/Baseline neuro status   Airway/Respiratory: Uneventful            Sign Out: Acceptable/Baseline resp. status   CV/Hemodynamics: Uneventful            Sign Out: Acceptable CV status; No obvious hypovolemia; No obvious fluid overload   Other NRE:    DID A NON-ROUTINE EVENT OCCUR?            Last vitals:  Vitals Value Taken Time   /72 11/08/23 1315   Temp 36.3  C (97.4  F) 11/08/23 1225   Pulse 75 11/08/23 1316   Resp 9 11/08/23 1316   SpO2 94 % 11/08/23 1316   Vitals shown include unfiled device data.    Electronically Signed By: ELIAS Gupta CRNA  November 8, 2023  4:54 PM

## 2023-11-08 NOTE — OP NOTE
[facility] Operative Note    Patient Name: Jacey Sandoval  MRN:[id number]  : 1975  Date of Surgery: 23   Pre-operative diagnosis:  Encounter for sterilization [Z30.2]   Post-operative diagnosis:  S/p laparoscopic bilateral salpingectomy   Procedure:  Procedure(s):  SALPINGECTOMY, LAPAROSCOPIC bilateral  Pap smear   Surgeon:  Dr. Antonia Barbour    Assistant(s):  Dr. Lilia Gorman  This assistance of this surgeon was required due to the need for additional skilled surgical hands to retract and hold instruments due to the nature of the surgical procedure and risk of complications.      Anesthesia:  General   Estimated blood loss:  15cc   Total IV fluids:  1,100ml crystaloid    Blood transfusion:  No transfusion was given during surgery    Total urine output:  Bladder drained at start of prep   Drains:  None   Specimens:  ID Type Source Tests Collected by Time Destination   1 : cervical pap smear Brushing Cervix HPV HIGH RISK TYPES DNA CERVICAL, GYNECOLOGIC CYTOLOGY Antonia Barbour DO 2023 11:33 AM    2 :  Tissue Fallopian Tube, Bilateral SURGICAL PATHOLOGY EXAM Antonia Barbour DO 2023 12:03 PM       Indication:  Patient is a 48 year old  with desire for permanent sterilization. Decision made to proceed with laparoscopic bilateral salpingectomy.    Prior to procedure risks benefits, complications, and alternatives were discussed with the patient.  Verbal and written consent were obtained.   Complications:  None apparent   Condition:  Stable, transferred to PACU        Findings: External Genitalia: Normal appearing female genitalia.    Laparoscopy: Difficulty with entry to the abdomen with Veress needle, converted to open laparoscopy with assistance of general surgeon.     Procedure:  Patient was met in the preoperative suite, where procedure was reviewed and consent obtained.  Patient was taken to the operative suite where general anesthesia was  administered without difficulty. Patient was then positioned in the dorsal lithotomy position, with special attention to avoid exaggeration of flexion or extension of the legs. Pap smear was obtained prior to vaginal prep. She was then prepped and draped in the usual sterile fashion. Time out was performed to confirm correct patient and procedure.     Attention was turned to the abdomen. A 5 mm infraumbilical incision was made.  The Veress needle then used in attempt to enter the peritoneal cavity. With attempted insufflation, pressure was high and could not confirm intraperitoneal placement. Decision made to proceed to open laparoscopic entry. Intra operative consultation requested from general surgeon due to difficulty with abdominal entry. Blunt and sharp dissection used to incise the fascia and enter the abdomen. A 5mm port was placed and insufflation of the abdomen ensued, with pneumoperitoneum being established. The laparoscope was then introduced with a thorough evaluation of the abdomen being performed and the above noted findings. A 5mm RLQ incision was then created and a 5mm port and trocar were introduced under direct visualization. Another 5mm port and trochar were placed in the LLQ. Prior to skin incisions, 0.25% Marcaine was injected at incision sites for local anesthesia. A full survey of the abdomen was performed with findings as listed above. Attention was then returned to the pelvis and the left fallopian tube was carried out to its fimbriated and the 5 mm LigaSure was introduced. The LigaSure was used to clamp, cauterize and cut the mesosalpinx at the area just below the fallopian tube. The fallopian tube was then removed through the port without difficulty and sent to pathology. Attention was then turned to the right fallopian tube which was carried out to its fimbriated end and elevated.  The LigaSure was again used to clamp, cauterize, and cut at the level just below the fallopian tube. The  fallopian tube was then removed through the port without difficulty and sent to pathology.  Upon completion, excellent hemostasis observed from the surgical sites. All instruments were then removed from the abdomen and the ports were removed from the abdominal wall. The fascial incision of the umbilical port was closed with #0 Vicryl. The skin incisions were closed with #4-0 undyed Vicryl in a running subcuticular fashion.  Skin glue was placed.    At the completion of the procedure all sponge, lap, and needle counts were correct x2.   At the conclusion of the procedure a debrief was performed.  The patient was awoken from anesthesia and extubated without any complication.  She was sent to recovery in stable condition.     Antonia Barbour DO

## 2023-11-08 NOTE — OR NURSING
Pt to sds. Pain is tolerable and takes oxycodone for pain. Texts  she's done and to get prescriptions. Abd soft no drng. Doing well. Eats and drinks pain 4/10 . Will cont to reassess

## 2023-11-08 NOTE — ANESTHESIA CARE TRANSFER NOTE
Patient: Jacey Sandoval    Procedure: Procedure(s):  SALPINGECTOMY, LAPAROSCOPIC bilateral       Diagnosis: Encounter for sterilization [Z30.2]  Diagnosis Additional Information: No value filed.    Anesthesia Type:   No value filed.     Note:    Oropharynx: spontaneously breathing  Level of Consciousness: drowsy  Oxygen Supplementation: face mask  Level of Supplemental Oxygen (L/min / FiO2): 6  Independent Airway: airway patency satisfactory and stable  Dentition: dentition unchanged  Vital Signs Stable: post-procedure vital signs reviewed and stable  Report to RN Given: handoff report given  Patient transferred to: Phase II    Handoff Report: Identifed the Patient, Identified the Reponsible Provider, Reviewed the pertinent medical history, Discussed the surgical course, Reviewed Intra-OP anesthesia mangement and issues during anesthesia, Set expectations for post-procedure period and Allowed opportunity for questions and acknowledgement of understanding  Vitals:  Vitals Value Taken Time   BP     Temp     Pulse     Resp 44 11/08/23 1226   SpO2 93 % 11/08/23 1226   Vitals shown include unfiled device data.    Electronically Signed By: ELIAS Her CRNA  November 8, 2023  12:28 PM

## 2023-11-08 NOTE — ANESTHESIA PROCEDURE NOTES
Airway       Patient location during procedure: OR       Procedure Start/Stop Times: 11/8/2023 11:13 AM  Staff -        CRNA: Laury Mcclendon APRN CRNA       Performed By: CRNAIndications and Patient Condition       Indications for airway management: sheila-procedural, airway protection and altered level of consciousness       Induction type:intravenous       Mask difficulty assessment: 1 - vent by mask    Final Airway Details       Final airway type: endotracheal airway       Successful airway: ETT and ETT - single  Endotracheal Airway Details        Cuffed: yes       Successful intubation technique: asleep and video laryngoscopy       VL Blade Size: MAC 3       Grade View of Cords: 1       Adjucts: stylet       Position: Right       Measured from: teeth       Secured at (cm): 22    Post intubation assessment        Placement verified by: capnometry, equal breath sounds and chest rise        Number of attempts at approach: 1       Ease of procedure: easy       Dentition: Unchanged    Medication(s) Administered   Medication Administration Time: 11/8/2023 11:13 AM

## 2023-11-11 LAB
BKR LAB AP GYN ADEQUACY: NORMAL
BKR LAB AP GYN INTERPRETATION: NORMAL
BKR LAB AP GYN OTHER FINDINGS: NORMAL
BKR LAB AP LMP: NORMAL
PATH REPORT.COMMENTS IMP SPEC: NORMAL
PATH REPORT.FINAL DX SPEC: NORMAL
PATH REPORT.GROSS SPEC: NORMAL
PATH REPORT.MICROSCOPIC SPEC OTHER STN: NORMAL
PATH REPORT.RELEVANT HX SPEC: NORMAL
PHOTO IMAGE: NORMAL

## 2024-02-06 ENCOUNTER — PATIENT OUTREACH (OUTPATIENT)
Dept: OBGYN | Facility: CLINIC | Age: 49
End: 2024-02-06
Payer: COMMERCIAL

## 2024-02-06 NOTE — TELEPHONE ENCOUNTER
Panel Management Review      Health Maintenance List    Health Maintenance   Topic Date Due    ADVANCE CARE PLANNING  Never done    MAMMO SCREENING  Never done    MIGRAINE ACTION PLAN  Never done    HEPATITIS B IMMUNIZATION (1 of 3 - 3-dose series) Never done    HEPATITIS A IMMUNIZATION (1 of 2 - Risk 2-dose series) Never done    YEARLY PREVENTIVE VISIT  10/08/2004    LIPID  Never done    GLUCOSE  2018    COLORECTAL CANCER SCREENING  2020    PHQ-2 (once per calendar year)  2024    PAP  2024    DTAP/TDAP/TD IMMUNIZATION (3 - Td or Tdap) 2025    HEPATITIS C SCREENING  Completed    HIV SCREENING  Completed    INFLUENZA VACCINE  Completed    COVID-19 Vaccine  Completed    Pneumococcal Vaccine: Pediatrics (0 to 5 Years) and At-Risk Patients (6 to 64 Years)  Aged Out    IPV IMMUNIZATION  Aged Out    HPV IMMUNIZATION  Aged Out    MENINGITIS IMMUNIZATION  Aged Out    RSV MONOCLONAL ANTIBODY  Aged Out       Composite cancer screening  Chart review shows that this patient is due/due soon for the following Mammogram and Colonoscopy  Lab Results   Component Value Date    PAP NIL 2010     Past Surgical History:   Procedure Laterality Date    ABDOMEN SURGERY      c- section     SECTION      x2    COLONOSCOPY  2010    GASTRIC BYPASS  2003    Luci-en-Y    LAPAROSCOPIC SALPINGECTOMY Bilateral 2023    Procedure: SALPINGECTOMY, LAPAROSCOPIC bilateral;  Surgeon: Antonia Barbour DO;  Location: [place] OR    [facility] NONSPECIFIC PROCEDURE  2002    Childbirth       Is hysterectomy listed in surgical history? No   Is mastectomy listed in surgical history? No     Summary:    Patient is due/failing the following:   Mammogram and Colonoscopy    Action needed: Patient needs office visit for colonoscopy, mammogram.    Type of outreach:  Sent [employer] message.      Staff Signature:  Kenyetta Mlulins CMA

## 2024-02-06 NOTE — LETTER
2024      Jacey Sandoval  [address]  [place] [address]              Dear Jacey,      To ensure we are providing the best quality care, we have reviewed your chart and see that you are due for:    Breast Cancer Screening:    Please call [facility]  at [phone] to schedule a Mammogram.    Colon Cancer Screening:    If you have received a referral to schedule a Colonoscopy or choose to do a Colonoscopy instead of the FIT/ Cologuard test, please call [phone] to schedule.    If you have received a FIT test kit from a prior office visit, please check the expiration date. If , please get a new kit from the Lab/ Clinic. If not , please complete the test and mail in as soon as you are able.    If you would prefer to complete a Cologuard test, please reach out to your provider to see if this is an option for you.    You may call Dept: [phone] if you have any questions. If you have completed the tests outside of [facility], please have the results forwarded to our office Fax # [phone]. We will update the chart for your primary Physician to review before your next annual physical.    Sincerely,      Antonia Barbour DO

## 2024-12-21 ENCOUNTER — HEALTH MAINTENANCE LETTER (OUTPATIENT)
Age: 49
End: 2024-12-21

## 2025-03-28 ENCOUNTER — APPOINTMENT (OUTPATIENT)
Dept: URBAN - METROPOLITAN AREA CLINIC 259 | Age: 50
Setting detail: DERMATOLOGY
End: 2025-03-28

## 2025-03-28 DIAGNOSIS — L57.0 ACTINIC KERATOSIS: ICD-10-CM

## 2025-03-28 DIAGNOSIS — L57.8 OTHER SKIN CHANGES DUE TO CHRONIC EXPOSURE TO NONIONIZING RADIATION: ICD-10-CM

## 2025-03-28 PROCEDURE — OTHER COUNSELING: OTHER

## 2025-03-28 PROCEDURE — OTHER LIQUID NITROGEN: OTHER

## 2025-03-28 PROCEDURE — OTHER MIPS QUALITY: OTHER

## 2025-03-28 ASSESSMENT — LOCATION SIMPLE DESCRIPTION DERM
LOCATION SIMPLE: RIGHT CHEEK
LOCATION SIMPLE: LEFT CHEEK
LOCATION SIMPLE: LEFT FOREHEAD
LOCATION SIMPLE: LEFT TEMPLE
LOCATION SIMPLE: SUPERIOR FOREHEAD

## 2025-03-28 ASSESSMENT — LOCATION DETAILED DESCRIPTION DERM
LOCATION DETAILED: LEFT INFERIOR MEDIAL MALAR CHEEK
LOCATION DETAILED: LEFT CENTRAL TEMPLE
LOCATION DETAILED: SUPERIOR MID FOREHEAD
LOCATION DETAILED: RIGHT INFERIOR CENTRAL MALAR CHEEK
LOCATION DETAILED: LEFT INFERIOR CENTRAL MALAR CHEEK
LOCATION DETAILED: LEFT SUPERIOR FOREHEAD
LOCATION DETAILED: LEFT SUPERIOR MEDIAL FOREHEAD

## 2025-03-28 ASSESSMENT — LOCATION ZONE DERM: LOCATION ZONE: FACE

## 2025-03-28 NOTE — PROCEDURE: LIQUID NITROGEN
Post-Care Instructions: I reviewed with the patient in detail post-care instructions. Patient is to wear sunprotection, and avoid picking at any of the treated lesions. Pt may apply Vaseline to crusted or scabbing areas.
Duration Of Freeze Thaw-Cycle (Seconds): 0
Consent: The patient's consent was obtained including but not limited to risks of crusting, scabbing, blistering, scarring, darker or lighter pigmentary change, recurrence, incomplete removal and infection.
Render Note In Bullet Format When Appropriate: No
Number Of Freeze-Thaw Cycles: 1 freeze-thaw cycle
Show Applicator Variable?: Yes
Detail Level: Zone

## 2025-03-28 NOTE — HPI: EVALUATION OF SKIN LESION(S)
What Type Of Note Output Would You Prefer (Optional)?: Standard Output
How Severe Are Your Spot(S)?: mild
Have Your Spot(S) Been Treated In The Past?: has not been treated
Hpi Title: Evaluation of Skin Lesions
Additional History: Patient states she has several spots of concern located on her face that appear flakeyness in nature and are accompanied by pain that comes and goes and worsens when patient is out in the sun. Patient also notes bleeding from time to time.

## 2025-06-26 ENCOUNTER — OFFICE VISIT (OUTPATIENT)
Dept: FAMILY MEDICINE | Facility: CLINIC | Age: 50
End: 2025-06-26
Payer: COMMERCIAL

## 2025-06-26 VITALS
OXYGEN SATURATION: 95 % | HEIGHT: 65 IN | WEIGHT: 225 LBS | HEART RATE: 88 BPM | DIASTOLIC BLOOD PRESSURE: 69 MMHG | SYSTOLIC BLOOD PRESSURE: 108 MMHG | TEMPERATURE: 97.5 F | RESPIRATION RATE: 16 BRPM | BODY MASS INDEX: 37.49 KG/M2

## 2025-06-26 DIAGNOSIS — Z11.3 SCREEN FOR STD (SEXUALLY TRANSMITTED DISEASE): ICD-10-CM

## 2025-06-26 DIAGNOSIS — M51.362 DEGENERATION OF INTERVERTEBRAL DISC OF LUMBAR REGION WITH DISCOGENIC BACK PAIN AND LOWER EXTREMITY PAIN: ICD-10-CM

## 2025-06-26 DIAGNOSIS — G89.29 CHRONIC MIDLINE LOW BACK PAIN, UNSPECIFIED WHETHER SCIATICA PRESENT: ICD-10-CM

## 2025-06-26 DIAGNOSIS — Z01.818 PREOP GENERAL PHYSICAL EXAM: Primary | ICD-10-CM

## 2025-06-26 DIAGNOSIS — M54.50 CHRONIC MIDLINE LOW BACK PAIN, UNSPECIFIED WHETHER SCIATICA PRESENT: ICD-10-CM

## 2025-06-26 PROBLEM — Z98.84 HISTORY OF GASTRIC BYPASS: Status: ACTIVE | Noted: 2025-06-26

## 2025-06-26 PROBLEM — M51.369 DDD (DEGENERATIVE DISC DISEASE), LUMBAR: Status: ACTIVE | Noted: 2025-06-26

## 2025-06-26 LAB
BASOPHILS # BLD AUTO: 0 10E3/UL (ref 0–0.2)
BASOPHILS NFR BLD AUTO: 0 %
EOSINOPHIL # BLD AUTO: 0 10E3/UL (ref 0–0.7)
EOSINOPHIL NFR BLD AUTO: 0 %
ERYTHROCYTE [DISTWIDTH] IN BLOOD BY AUTOMATED COUNT: 16.8 % (ref 10–15)
HCT VFR BLD AUTO: 37.8 % (ref 35–47)
HGB BLD-MCNC: 11.8 G/DL (ref 11.7–15.7)
IMM GRANULOCYTES # BLD: 0.1 10E3/UL
IMM GRANULOCYTES NFR BLD: 1 %
LYMPHOCYTES # BLD AUTO: 0.8 10E3/UL (ref 0.8–5.3)
LYMPHOCYTES NFR BLD AUTO: 8 %
MCH RBC QN AUTO: 24.1 PG (ref 26.5–33)
MCHC RBC AUTO-ENTMCNC: 31.2 G/DL (ref 31.5–36.5)
MCV RBC AUTO: 77 FL (ref 78–100)
MONOCYTES # BLD AUTO: 0.1 10E3/UL (ref 0–1.3)
MONOCYTES NFR BLD AUTO: 1 %
NEUTROPHILS # BLD AUTO: 8.6 10E3/UL (ref 1.6–8.3)
NEUTROPHILS NFR BLD AUTO: 90 %
PLATELET # BLD AUTO: 303 10E3/UL (ref 150–450)
RBC # BLD AUTO: 4.9 10E6/UL (ref 3.8–5.2)
WBC # BLD AUTO: 9.6 10E3/UL (ref 4–11)

## 2025-06-26 ASSESSMENT — ANXIETY QUESTIONNAIRES
7. FEELING AFRAID AS IF SOMETHING AWFUL MIGHT HAPPEN: MORE THAN HALF THE DAYS
8. IF YOU CHECKED OFF ANY PROBLEMS, HOW DIFFICULT HAVE THESE MADE IT FOR YOU TO DO YOUR WORK, TAKE CARE OF THINGS AT HOME, OR GET ALONG WITH OTHER PEOPLE?: EXTREMELY DIFFICULT
GAD7 TOTAL SCORE: 13
GAD7 TOTAL SCORE: 13
IF YOU CHECKED OFF ANY PROBLEMS ON THIS QUESTIONNAIRE, HOW DIFFICULT HAVE THESE PROBLEMS MADE IT FOR YOU TO DO YOUR WORK, TAKE CARE OF THINGS AT HOME, OR GET ALONG WITH OTHER PEOPLE: EXTREMELY DIFFICULT
2. NOT BEING ABLE TO STOP OR CONTROL WORRYING: MORE THAN HALF THE DAYS
7. FEELING AFRAID AS IF SOMETHING AWFUL MIGHT HAPPEN: MORE THAN HALF THE DAYS
6. BECOMING EASILY ANNOYED OR IRRITABLE: MORE THAN HALF THE DAYS
4. TROUBLE RELAXING: MORE THAN HALF THE DAYS
3. WORRYING TOO MUCH ABOUT DIFFERENT THINGS: MORE THAN HALF THE DAYS
GAD7 TOTAL SCORE: 13
5. BEING SO RESTLESS THAT IT IS HARD TO SIT STILL: SEVERAL DAYS
1. FEELING NERVOUS, ANXIOUS, OR ON EDGE: MORE THAN HALF THE DAYS

## 2025-06-26 ASSESSMENT — PAIN SCALES - GENERAL: PAINLEVEL_OUTOF10: SEVERE PAIN (7)

## 2025-06-26 ASSESSMENT — PATIENT HEALTH QUESTIONNAIRE - PHQ9
SUM OF ALL RESPONSES TO PHQ QUESTIONS 1-9: 13
SUM OF ALL RESPONSES TO PHQ QUESTIONS 1-9: 13
10. IF YOU CHECKED OFF ANY PROBLEMS, HOW DIFFICULT HAVE THESE PROBLEMS MADE IT FOR YOU TO DO YOUR WORK, TAKE CARE OF THINGS AT HOME, OR GET ALONG WITH OTHER PEOPLE: VERY DIFFICULT

## 2025-06-26 NOTE — PROGRESS NOTES
Preoperative Evaluation  [facility]  [address], [address]  [place] [address]  Phone: [phone]  Primary Provider: Physician No Ref-Primary  Pre-op Performing Provider: Alexis Joiner PA-C  Jun 26, 2025 6/26/2025   Surgical Information   What procedure is being done? spinal fusion   Facility or Hospital where procedure/surgery will be performed: [facility]   Who is doing the procedure / surgery? dr rivas   Date of surgery / procedure: june 30   Time of surgery / procedure: 630am check in   Where do you plan to recover after surgery? at home with family     Fax number for surgical facility: tbd    Assessment & Plan     The proposed surgical procedure is considered INTERMEDIATE risk.      ICD-10-CM    1. Preop general physical exam  Z01.818 Comprehensive metabolic panel     CBC with Platelets & Differential     Comprehensive metabolic panel     CBC with Platelets & Differential      2. Degeneration of intervertebral disc of lumbar region with discogenic back pain and lower extremity pain  M51.362       3. Chronic midline low back pain, unspecified whether sciatica present  M54.50 LYME DISEASE TOTAL ANTIBODIES WITH REFLEX TO CONFIRMATION    G89.29 LYME DISEASE TOTAL ANTIBODIES WITH REFLEX TO CONFIRMATION      4. Screen for STD (sexually transmitted disease)  Z11.3 Treponema Abs w Reflex to RPR and Titer     HIV Antigen Antibody Combo Cascade     Chlamydia trachomatis/Neisseria gonorrhoeae by PCR     Treponema Abs w Reflex to RPR and Titer     HIV Antigen Antibody Combo Cascade     Chlamydia trachomatis/Neisseria gonorrhoeae by PCR             Depression Screening Follow Up        6/26/2025     2:04 PM   PHQ   PHQ-9 Total Score 13    Q9: Thoughts of better off dead/self-harm past 2 weeks Not at all       Patient-reported       Follow Up Actions Taken  Patient declined referral.           - No identified additional risk factors other than previously  addressed    Antiplatelet or Anticoagulation Medication Instructions   - We reviewed the medication list and the patient is not on an antiplatelet or anticoagulation medications.    Additional Medication Instructions  We reviewed the medication list and there are no chronic medications that need to be adjusted for this procedure.    Recommendation  Approval given to proceed with proposed procedure, without further diagnostic evaluation.    Follow-up  Return in about 6 months (around 12/26/2025) for with PCP, Routine Visit, or sooner with worsening symptoms.    Leanna Mari is a 49 year old, presenting for the following:  Pre-Op Exam          6/26/2025     2:03 PM   Additional Questions   Roomed by Doron GIRALDO: history of chronic back pain and DDD with plans for spine fusion of lumbar spine          6/26/2025   Pre-Op Questionnaire   Have you ever had a heart attack or stroke? No   Have you ever had surgery on your heart or blood vessels, such as a stent placement, a coronary artery bypass, or surgery on an artery in your head, neck, heart, or legs? No   Do you have chest pain with activity? No   Do you have a history of heart failure? No   Do you currently have a cold, bronchitis or symptoms of other infection? No   Do you have a cough, shortness of breath, or wheezing? No   Do you or anyone in your family have previous history of blood clots? No   Do you or does anyone in your family have a serious bleeding problem such as prolonged bleeding following surgeries or cuts? No   Have you ever had problems with anemia or been told to take iron pills? (!) YES     Have you had any abnormal blood loss such as black, tarry or bloody stools, or abnormal vaginal bleeding? No   Have you ever had a blood transfusion? No   Are you willing to have a blood transfusion if it is medically needed before, during, or after your surgery? Yes   Have you or any of your relatives ever had problems with anesthesia? No   Do you  have sleep apnea, excessive snoring or daytime drowsiness? No   Do you have any artifical heart valves or other implanted medical devices like a pacemaker, defibrillator, or continuous glucose monitor? No   Do you have artificial joints? No   Are you allergic to latex? No     Advance Care Planning    Discussed advance care planning with patient; informed AVS has link to [employer].    Preoperative Review of    reviewed - no record of controlled substances prescribed.      Status of Chronic Conditions:  See problem list for active medical problems.  Problems all longstanding and stable, except as noted/documented.  See ROS for pertinent symptoms related to these conditions.    Patient Active Problem List    Diagnosis Date Noted    DDD (degenerative disc disease), lumbar 2025     Priority: Medium    History of gastric bypass 2025     Priority: Medium    Status post bariatric surgery 2010     Priority: Medium     Luci en y      Obesity 2004     Priority: Medium     Problem list name updated by automated process. Provider to review      Migraine 10/08/2003     Priority: Medium     Problem list name updated by automated process. Provider to review        Past Medical History:   Diagnosis Date    Allergic rhinitis due to other allergen     Anxiety     ASC-H pap     Neg colpo    Closed fibular fracture 2012    Depressive disorder, not elsewhere classified     Migraine, unspecified, without mention of intractable migraine without mention of status migrainosus     Sleep disturbance, unspecified     Substance abuse (H)     alcohol abuse.      Past Surgical History:   Procedure Laterality Date    ABDOMEN SURGERY      c- section     SECTION      x2    COLONOSCOPY  2010    GASTRIC BYPASS  2003    Luci-en-Y    LAPAROSCOPIC SALPINGECTOMY Bilateral 2023    Procedure: SALPINGECTOMY, LAPAROSCOPIC bilateral;  Surgeon: Antonia Barbour DO;  Location: [place] OR    [facility]  "NONSPECIFIC PROCEDURE  01/01/2002    Childbirth     No current outpatient medications on file.       Allergies   Allergen Reactions    Zofran [Ondansetron Hcl] Other (See Comments)    Tape [Adhesive Tape] Rash        Social History     Tobacco Use    Smoking status: Never    Smokeless tobacco: Never   Substance Use Topics    Alcohol use: No     Comment:  in AA currently 3/11/2013 - currently in tx     Family History   Problem Relation Age of Onset    Connective Tissue Disorder Mother         ms    Unknown/Adopted Mother     Depression Mother     Bipolar Disorder Mother     Substance Abuse Mother     Dementia Mother     Unknown/Adopted Father     Depression Father      History   Drug Use No             Review of Systems  Constitutional, neuro, ENT, endocrine, pulmonary, cardiac, gastrointestinal, genitourinary, musculoskeletal, integument and psychiatric systems are negative, except as otherwise noted.    Objective    /69   Pulse 88   Temp 97.5  F (36.4  C) (Temporal)   Resp 16   Ht 1.641 m (5' 4.6\")   Wt 102.1 kg (225 lb)   LMP 02/26/2025 (Within Weeks)   SpO2 95%   BMI 37.91 kg/m     Estimated body mass index is 37.91 kg/m  as calculated from the following:    Height as of this encounter: 1.641 m (5' 4.6\").    Weight as of this encounter: 102.1 kg (225 lb).  Physical Exam  GENERAL: alert and no distress  EYES: Eyes grossly normal to inspection, PERRL and conjunctivae and sclerae normal  HENT: ear canals and TM's normal, nose and mouth without ulcers or lesions  NECK: no adenopathy, no asymmetry, masses, or scars  RESP: lungs clear to auscultation - no rales, rhonchi or wheezes  CV: regular rate and rhythm, normal S1 S2, no S3 or S4, no murmur, click or rub, no peripheral edema  MS: no gross musculoskeletal defects noted, no edema  SKIN: no suspicious lesions or rashes  NEURO: Normal strength and tone, mentation intact and speech normal  PSYCH: mentation appears normal, affect normal/bright    No " "results for input(s): \"HGB\", \"PLT\", \"INR\", \"NA\", \"POTASSIUM\", \"CR\", \"A1C\" in the last 8760 hours.     Diagnostics  Labs pending at this time.  Results will be reviewed when available.   No EKG required, no history of coronary heart disease, significant arrhythmia, peripheral arterial disease or other structural heart disease.    Revised Cardiac Risk Index (RCRI)  The patient has the following serious cardiovascular risks for perioperative complications:   - No serious cardiac risks = 0 points     RCRI Interpretation: 0 points: Class I (very low risk - 0.4% complication rate)         Signed Electronically by: Alexis Joiner PA-C  A copy of this evaluation report is provided to the requesting physician.         Answers submitted by the patient for this visit:  Patient Health Questionnaire (Submitted on 6/26/2025)  If you checked off any problems, how difficult have these problems made it for you to do your work, take care of things at home, or get along with other people?: Very difficult  PHQ9 TOTAL SCORE: 13  Patient Health Questionnaire (G7) (Submitted on 6/26/2025)  AHSAN 7 TOTAL SCORE: 13    "

## 2025-06-26 NOTE — PATIENT INSTRUCTIONS
How to Take Your Medication Before Surgery  Preoperative Medication Instructions   Antiplatelet or Anticoagulation Medication Instructions   - We reviewed the medication list and the patient is not on an antiplatelet or anticoagulation medications.    Additional Medication Instructions  We reviewed the medication list and there are no chronic medications that need to be adjusted for this procedure.       Patient Education   Preparing for Your Surgery  For Adults  Getting started  In most cases, a nurse will call to review your health history and instructions. They will give you an arrival time based on your scheduled surgery time. Please be ready to share:  Your doctor's clinic name and phone number  Your medical, surgical, and anesthesia history  A list of allergies and sensitivities  A list of medicines, including herbal treatments and over-the-counter drugs  Whether the patient has a legal guardian (ask how to send us the papers in advance)  Note: You may not receive a call if you were seen at our PAC (Preoperative Assessment Center).  Please tell us if you're pregnant--or if there's any chance you might be pregnant. Some surgeries may injure a fetus (unborn baby), so they require a pregnancy test. Surgeries that are safe for a fetus don't always need a test, and you can choose whether to have one.   Preparing for surgery  Within 10 to 30 days of surgery: Have a pre-op exam (sometimes called an H&P, or History and Physical). This can be done at a clinic or pre-operative center.  If you're having a , you may not need this exam. Talk to your care team.  At your pre-op exam, talk to your care team about all medicines you take. (This includes CBD oil and any drugs, such as THC, marijuana, and other forms of cannabis.) If you need to stop any medicine before surgery, ask when to start taking it again.  This is for your safety. Many medicines and drugs can make you bleed too much during surgery. Some change  how well surgery (anesthesia) drugs work.  Call your insurance company to let them know you're having surgery. (If you don't have insurance, call [phone].)  Call your clinic if there's any change in your health. This includes a scrape or scratch near the surgery site, or any signs of a cold (sore throat, runny nose, cough, rash, fever).  Eating and drinking guidelines  For your safety: Unless your surgeon tells you otherwise, follow the guidelines below.  Eat and drink as normal until 8 hours before you arrive for surgery. After that, no food or milk. You can spit out gum when you arrive.  Drink clear liquids until 2 hours before you arrive. These are liquids you can see through, like water, Gatorade, and Propel Water. They also include plain black coffee and tea (no cream or milk).  No alcohol for 24 hours before you arrive. The night before surgery, stop any drinks that contain THC.  If your care team tells you to take medicine on the morning of surgery, it's okay to take it with a sip of water. No other medicines or drugs are allowed (including CBD oil)--follow your care team's instructions.  If you have questions the day of surgery, call your hospital or surgery center.   Preventing infection  Shower or bathe the night before and the morning of surgery. Follow the instructions your clinic gave you. (If no instructions, use regular soap.)  Don't shave or clip hair near your surgery site. We'll remove the hair if needed.  Don't smoke or vape the morning of surgery. No chewing tobacco for 6 hours before you arrive. A nicotine patch is okay. You may spit out nicotine gum when you arrive.  For some surgeries, the surgeon will tell you to fully quit smoking and nicotine.  We will make every effort to keep you safe from infection. We will:  Clean our hands often with soap and water (or an alcohol-based hand rub).  Clean the skin at your surgery site with a special soap that kills germs.  Give you a special gown to  keep you warm. (Cold raises the risk of infection.)  Wear hair covers, masks, gowns, and gloves during surgery.  Give antibiotic medicine, if prescribed. Not all surgeries need this medicine.  What to bring on the day of surgery  Photo ID and insurance card  Copy of your health care directive, if you have one  Glasses and hearing aids (bring cases)  You can't wear contacts during surgery  Inhaler and eye drops, if you use them (tell us about these when you arrive)  CPAP machine or breathing device, if you use them  A few personal items, if spending the night  If you have . . .  A pacemaker, ICD (cardiac defibrillator), or other implant: Bring the ID card.  An implanted stimulator: Bring the remote control.  A legal guardian: Bring a copy of the certified (court-stamped) guardianship papers.  Please remove any jewelry, including body piercings. Leave jewelry and other valuables at home.  If you're going home the day of surgery  You must have a support person drive you home. They should stay with you overnight, and they may need to help with your self-care.  If you don't have a support person, please tells us as soon as possible. We can help.  After surgery  If it's hard to control your pain or you need more pain medicine, please call your surgeon's office.  Questions?   If you have any questions for your care team, list them here:   ____________________________________________________________________________________________________________________________________________________________________________________________________________________________________________________________  For informational purposes only. Not to replace the advice of your health care provider. Copyright   2003, 2019 [facility]. All rights reserved. Clinically reviewed by Agustin Renteria MD. [employer] 887411 - REV 02/25.

## 2025-06-27 ENCOUNTER — RESULTS FOLLOW-UP (OUTPATIENT)
Dept: FAMILY MEDICINE | Facility: CLINIC | Age: 50
End: 2025-06-27

## 2025-07-09 ENCOUNTER — OFFICE VISIT (OUTPATIENT)
Dept: FAMILY MEDICINE | Facility: CLINIC | Age: 50
End: 2025-07-09
Payer: COMMERCIAL

## 2025-07-09 VITALS
BODY MASS INDEX: 37.49 KG/M2 | HEIGHT: 65 IN | TEMPERATURE: 98.4 F | HEART RATE: 83 BPM | OXYGEN SATURATION: 96 % | WEIGHT: 225 LBS | DIASTOLIC BLOOD PRESSURE: 77 MMHG | SYSTOLIC BLOOD PRESSURE: 118 MMHG | RESPIRATION RATE: 22 BRPM

## 2025-07-09 DIAGNOSIS — Z98.1 S/P LUMBAR SPINAL FUSION: ICD-10-CM

## 2025-07-09 DIAGNOSIS — R19.09 MASS OF LEFT INGUINAL REGION: Primary | ICD-10-CM

## 2025-07-09 LAB
BASOPHILS # BLD AUTO: 0.1 10E3/UL (ref 0–0.2)
BASOPHILS NFR BLD AUTO: 1 %
EOSINOPHIL # BLD AUTO: 0.2 10E3/UL (ref 0–0.7)
EOSINOPHIL NFR BLD AUTO: 3 %
ERYTHROCYTE [DISTWIDTH] IN BLOOD BY AUTOMATED COUNT: 17.5 % (ref 10–15)
HCT VFR BLD AUTO: 33 % (ref 35–47)
HGB BLD-MCNC: 10.3 G/DL (ref 11.7–15.7)
IMM GRANULOCYTES # BLD: 0.1 10E3/UL
IMM GRANULOCYTES NFR BLD: 2 %
LYMPHOCYTES # BLD AUTO: 1.4 10E3/UL (ref 0.8–5.3)
LYMPHOCYTES NFR BLD AUTO: 19 %
MCH RBC QN AUTO: 24.5 PG (ref 26.5–33)
MCHC RBC AUTO-ENTMCNC: 31.2 G/DL (ref 31.5–36.5)
MCV RBC AUTO: 79 FL (ref 78–100)
MONOCYTES # BLD AUTO: 0.4 10E3/UL (ref 0–1.3)
MONOCYTES NFR BLD AUTO: 6 %
NEUTROPHILS # BLD AUTO: 4.9 10E3/UL (ref 1.6–8.3)
NEUTROPHILS NFR BLD AUTO: 69 %
PLATELET # BLD AUTO: 287 10E3/UL (ref 150–450)
RBC # BLD AUTO: 4.2 10E6/UL (ref 3.8–5.2)
WBC # BLD AUTO: 7 10E3/UL (ref 4–11)

## 2025-07-09 PROCEDURE — 85025 COMPLETE CBC W/AUTO DIFF WBC: CPT | Performed by: FAMILY MEDICINE

## 2025-07-09 PROCEDURE — 99214 OFFICE O/P EST MOD 30 MIN: CPT | Performed by: FAMILY MEDICINE

## 2025-07-09 PROCEDURE — 1125F AMNT PAIN NOTED PAIN PRSNT: CPT | Performed by: FAMILY MEDICINE

## 2025-07-09 PROCEDURE — 3078F DIAST BP <80 MM HG: CPT | Performed by: FAMILY MEDICINE

## 2025-07-09 PROCEDURE — 36415 COLL VENOUS BLD VENIPUNCTURE: CPT | Performed by: FAMILY MEDICINE

## 2025-07-09 PROCEDURE — 3074F SYST BP LT 130 MM HG: CPT | Performed by: FAMILY MEDICINE

## 2025-07-09 RX ORDER — HYDROXYZINE PAMOATE 25 MG/1
25 CAPSULE ORAL 4 TIMES DAILY PRN
COMMUNITY
Start: 2025-07-03

## 2025-07-09 RX ORDER — SENNOSIDES 8.6 MG/1
17.2 TABLET ORAL 2 TIMES DAILY PRN
COMMUNITY
Start: 2025-07-03

## 2025-07-09 RX ORDER — ACETAMINOPHEN 500 MG
1000 TABLET ORAL EVERY 6 HOURS
COMMUNITY
Start: 2025-07-03

## 2025-07-09 RX ORDER — METHOCARBAMOL 500 MG/1
500 TABLET, FILM COATED ORAL 4 TIMES DAILY
COMMUNITY
Start: 2025-07-03

## 2025-07-09 RX ORDER — OXYCODONE HYDROCHLORIDE 5 MG/1
5 TABLET ORAL EVERY 6 HOURS PRN
COMMUNITY
Start: 2025-07-03

## 2025-07-09 ASSESSMENT — PAIN SCALES - GENERAL: PAINLEVEL_OUTOF10: SEVERE PAIN (7)

## 2025-07-09 NOTE — PROGRESS NOTES
"  Assessment & Plan     Jacey was seen today for mass.    Diagnoses and all orders for this visit:    Mass of left inguinal region   S/P lumbar spinal fusion  Small mobile left inguinal mass noted.  Differentials includes inflamed lymph node versus a benign cyst.  No signs of infection at this time.  Advised patient to proceed with a musculoskeletal ultrasound to characterize the mass.  Patient is recovering well after recent spinal fusion.  -     US MSK Limited; Future  -     CBC with platelets and differential; Future    Anemia unspecified  Mild postoperative anemia with hemoglobin of 10.3 grams per deciliter.  I would recommend repeating her CBC after 2 weeks.    BMI  Estimated body mass index is 37.91 kg/m  as calculated from the following:    Height as of this encounter: 1.641 m (5' 4.6\").    Weight as of this encounter: 102.1 kg (225 lb).   Follow-up   Return to clinic if symptoms persisted or fail to improve.  Schedule follow-up visit with your primary care provider after ultrasound.      Subjective   Jacey is a 50 year old, presenting for the following health issues:  Mass        7/9/2025    11:41 AM   Additional Questions   Roomed by Doron SLAUGHTER     History of Present Illness       Reason for visit:  Weird lump  Symptom onset:  3-7 days ago  Symptoms include:  Weird lump has gone from pea to mandarin orange in a few days  Symptom intensity:  Severe  Symptom progression:  Worsening  Had these symptoms before:  No   She is taking medications regularly.    Pleasant 50-year-old who presents to the clinic for evaluation of left inguinal mass.  Patient had revision lumbar decompression and spinal fusion at the level of L4-L5 on June 30, 2025.  Surgery was uncomplicated and patient continues to recover exactly as expected.  After being discharged from the hospital patient noticed a small left inguinal bulge and increased in size over the past week.  She denies any pain, redness, discharge.  Mild discomfort " "when she presses on it.  She denies any other concerns.    Review of Systems  Constitutional, neuro, ENT, endocrine, pulmonary, cardiac, gastrointestinal, genitourinary, musculoskeletal, integument and psychiatric systems are negative, except as otherwise noted.      Objective    /77   Pulse 83   Temp 98.4  F (36.9  C) (Temporal)   Resp 22   Ht 1.641 m (5' 4.6\")   Wt 102.1 kg (225 lb)   LMP 02/26/2025 (Within Weeks)   SpO2 96%   BMI 37.91 kg/m    Body mass index is 37.91 kg/m .  Physical Exam   GENERAL: alert and no distress  RESP: lungs clear to auscultation - no rales, rhonchi or wheezes  CV: regular rate and rhythm, normal S1 S2, no S3 or S4, no murmur, click or rub, no peripheral edema  Soft left inguinal mass noted.  No erythema or drainage noted.  Mild tenderness to palpation.          Signed Electronically by: Serg Georges MD    "

## 2025-07-15 ENCOUNTER — TELEPHONE (OUTPATIENT)
Dept: OBGYN | Facility: CLINIC | Age: 50
End: 2025-07-15
Payer: COMMERCIAL

## 2025-07-15 NOTE — TELEPHONE ENCOUNTER
Faxed order to [facility] electronically to fax [phone]    Called and updated patient that order faxed    Nissa BRANTLEY RN

## 2025-07-15 NOTE — TELEPHONE ENCOUNTER
FYI - Status Update    Who is Calling: patient    Update: PT got a referral for an ultrasound on leg/hip but the wait is until October. Please put in a new referral for [facility] ([phone]) as they have a shorter wait and she'd like to get in asap.       Does caller want a call/response back: Yes     Could we send this information to you in [employer] or would you prefer to receive a phone call?:   Patient would prefer a phone call   Okay to leave a detailed message?: Yes at Cell number on file:    Telephone Information:   Mobile [phone]

## 2025-07-16 ENCOUNTER — TRANSFERRED RECORDS (OUTPATIENT)
Dept: HEALTH INFORMATION MANAGEMENT | Facility: CLINIC | Age: 50
End: 2025-07-16
Payer: COMMERCIAL

## 2025-07-24 DIAGNOSIS — B99.9 INFECTION: Primary | ICD-10-CM

## (undated) DEVICE — ESU LIGASURE LAPAROSCOPIC BLUNT TIP SEALER 5MMX37CM LF1837

## (undated) DEVICE — SOL WATER IRRIG 1000ML BOTTLE 07139-09

## (undated) DEVICE — ENDO TROCAR SLEEVE KII ADV FIXATION 05X100MM CFS02

## (undated) DEVICE — SU DERMABOND ADVANCED .7ML DNX12

## (undated) DEVICE — SOL NACL 0.9% IRRIG 1000ML BOTTLE 07138-09

## (undated) DEVICE — SYR 05ML LL W/O NDL

## (undated) DEVICE — DRAPE POUCH INSTRUMENT 3 POCKET 1018L

## (undated) DEVICE — PREP CHLORHEXIDINE 4% 4OZ (HIBICLENS) 57504

## (undated) DEVICE — ESU HOLSTER PLASTIC DISP E2400

## (undated) DEVICE — ENDO TROCAR FIRST ENTRY KII FIOS ADV FIX 05X150MM CFF01

## (undated) DEVICE — TUBING C02 INSUFFLATION LAP FILTER HEATER 6198

## (undated) DEVICE — SUCTION MANIFOLD NEPTUNE 2 SYS 1 PORT 702-025-000

## (undated) DEVICE — BLADE KNIFE SURG 11 371111

## (undated) DEVICE — GLOVE BIOGEL PI ULTRATOUCH SZ 6.5 41165

## (undated) DEVICE — CATH INTERMITTENT CLEAN-CATH FEMALE 14FR 6" VINYL LF 420614

## (undated) DEVICE — ENDO TROCAR FIRST ENTRY KII FIOS ADV FIX 05X100MM CFF03

## (undated) DEVICE — STOCKING SLEEVE COMPRESSION CALF MED

## (undated) DEVICE — ESU PENCIL W/COATED BLADE E2450H

## (undated) DEVICE — PREP CHLORAPREP 26ML TINTED ORANGE  260815

## (undated) DEVICE — PAD PERI INDIV WRAP 11" 2022

## (undated) DEVICE — SU VICRYL 4-0 FS-2 27" J422-H

## (undated) DEVICE — ANTIFOG SOLUTION W/FOAM PAD 31142527

## (undated) DEVICE — SU VICRYL 0 UR-6 27" J603H

## (undated) DEVICE — GOWN LG DISP 9515

## (undated) DEVICE — SYSTEM LAPAROVUE VISIBILITY LAPVUE10

## (undated) DEVICE — PACK LAPAROSCOPY/PELVISCOPY STD

## (undated) DEVICE — NDL INSUFFLATION 13GA 120MM C2201

## (undated) RX ORDER — KETOROLAC TROMETHAMINE 30 MG/ML
INJECTION, SOLUTION INTRAMUSCULAR; INTRAVENOUS
Status: DISPENSED
Start: 2023-11-08

## (undated) RX ORDER — BUPIVACAINE HYDROCHLORIDE 2.5 MG/ML
INJECTION, SOLUTION EPIDURAL; INFILTRATION; INTRACAUDAL
Status: DISPENSED
Start: 2023-11-08

## (undated) RX ORDER — FENTANYL CITRATE 50 UG/ML
INJECTION, SOLUTION INTRAMUSCULAR; INTRAVENOUS
Status: DISPENSED
Start: 2023-11-08

## (undated) RX ORDER — DEXAMETHASONE SODIUM PHOSPHATE 10 MG/ML
INJECTION, SOLUTION INTRAMUSCULAR; INTRAVENOUS
Status: DISPENSED
Start: 2023-11-08

## (undated) RX ORDER — ACETAMINOPHEN 325 MG/1
TABLET ORAL
Status: DISPENSED
Start: 2023-11-08

## (undated) RX ORDER — GABAPENTIN 300 MG/1
CAPSULE ORAL
Status: DISPENSED
Start: 2023-11-08

## (undated) RX ORDER — GLYCOPYRROLATE 0.2 MG/ML
INJECTION, SOLUTION INTRAMUSCULAR; INTRAVENOUS
Status: DISPENSED
Start: 2023-11-08

## (undated) RX ORDER — OXYCODONE HYDROCHLORIDE 5 MG/1
TABLET ORAL
Status: DISPENSED
Start: 2023-11-08